# Patient Record
Sex: MALE | Race: WHITE | NOT HISPANIC OR LATINO | URBAN - METROPOLITAN AREA
[De-identification: names, ages, dates, MRNs, and addresses within clinical notes are randomized per-mention and may not be internally consistent; named-entity substitution may affect disease eponyms.]

---

## 2018-07-10 ENCOUNTER — OUTPATIENT (OUTPATIENT)
Dept: OUTPATIENT SERVICES | Facility: HOSPITAL | Age: 58
LOS: 1 days | End: 2018-07-10
Payer: COMMERCIAL

## 2018-07-10 ENCOUNTER — APPOINTMENT (OUTPATIENT)
Dept: UROLOGY | Facility: CLINIC | Age: 58
End: 2018-07-10
Payer: COMMERCIAL

## 2018-07-10 DIAGNOSIS — E34.9 ENDOCRINE DISORDER, UNSPECIFIED: ICD-10-CM

## 2018-07-10 LAB
25(OH)D3 SERPL-MCNC: 27.1 NG/ML
BASOPHILS # BLD AUTO: 0.05 K/UL
BASOPHILS NFR BLD AUTO: 0.8 %
EOSINOPHIL # BLD AUTO: 0.22 K/UL
EOSINOPHIL NFR BLD AUTO: 3.4 %
HBA1C MFR BLD HPLC: 5.3 %
HCT VFR BLD CALC: 51.3 %
HGB BLD-MCNC: 17.7 G/DL
IMM GRANULOCYTES NFR BLD AUTO: 0 %
LYMPHOCYTES # BLD AUTO: 2.3 K/UL
LYMPHOCYTES NFR BLD AUTO: 35.1 %
MAN DIFF?: NORMAL
MCHC RBC-ENTMCNC: 31.2 PG
MCHC RBC-ENTMCNC: 34.5 GM/DL
MCV RBC AUTO: 90.3 FL
MONOCYTES # BLD AUTO: 0.54 K/UL
MONOCYTES NFR BLD AUTO: 8.2 %
NEUTROPHILS # BLD AUTO: 3.44 K/UL
NEUTROPHILS NFR BLD AUTO: 52.5 %
PLATELET # BLD AUTO: 156 K/UL
RBC # BLD: 5.68 M/UL
RBC # FLD: 15.6 %
WBC # FLD AUTO: 6.55 K/UL

## 2018-07-10 PROCEDURE — 99215 OFFICE O/P EST HI 40 MIN: CPT | Mod: 25

## 2018-07-10 PROCEDURE — 93980 PENILE VASCULAR STUDY: CPT | Mod: 26

## 2018-07-10 PROCEDURE — 93980 PENILE VASCULAR STUDY: CPT

## 2018-07-10 RX ORDER — TADALAFIL 20 MG/1
20 TABLET, FILM COATED ORAL
Qty: 10 | Refills: 3 | Status: DISCONTINUED | COMMUNITY
Start: 2018-07-10 | End: 2018-07-10

## 2018-07-11 LAB
ALBUMIN SERPL ELPH-MCNC: 4.3 G/DL
ALP BLD-CCNC: 67 U/L
ALT SERPL-CCNC: 15 U/L
ANION GAP SERPL CALC-SCNC: 15 MMOL/L
AST SERPL-CCNC: 15 U/L
BILIRUB SERPL-MCNC: 0.3 MG/DL
BUN SERPL-MCNC: 22 MG/DL
CALCIUM SERPL-MCNC: 9.3 MG/DL
CHLORIDE SERPL-SCNC: 99 MMOL/L
CHOLEST SERPL-MCNC: 160 MG/DL
CHOLEST/HDLC SERPL: 7 RATIO
CO2 SERPL-SCNC: 25 MMOL/L
CREAT SERPL-MCNC: 1.21 MG/DL
ESTRADIOL SERPL-MCNC: 54 PG/ML
GLUCOSE SERPL-MCNC: 91 MG/DL
HDLC SERPL-MCNC: 23 MG/DL
LDLC SERPL CALC-MCNC: 98 MG/DL
LH SERPL-ACNC: <0.1 IU/L
POTASSIUM SERPL-SCNC: 4.3 MMOL/L
PROLACTIN SERPL-MCNC: 7.8 NG/ML
PROT SERPL-MCNC: 7 G/DL
PSA SERPL-MCNC: 1.42 NG/ML
SODIUM SERPL-SCNC: 139 MMOL/L
TRIGL SERPL-MCNC: 194 MG/DL
TSH SERPL-ACNC: 3.01 UIU/ML

## 2018-07-13 DIAGNOSIS — N52.9 MALE ERECTILE DYSFUNCTION, UNSPECIFIED: ICD-10-CM

## 2018-07-13 DIAGNOSIS — E34.9 ENDOCRINE DISORDER, UNSPECIFIED: ICD-10-CM

## 2018-07-15 LAB
TESTOST BND SERPL-MCNC: 14.4 PG/ML
TESTOST SERPL-MCNC: 592.4 NG/DL

## 2018-07-24 ENCOUNTER — APPOINTMENT (OUTPATIENT)
Dept: UROLOGY | Facility: CLINIC | Age: 58
End: 2018-07-24
Payer: COMMERCIAL

## 2018-07-24 DIAGNOSIS — N52.9 MALE ERECTILE DYSFUNCTION, UNSPECIFIED: ICD-10-CM

## 2018-07-24 PROCEDURE — 99214 OFFICE O/P EST MOD 30 MIN: CPT

## 2019-03-05 ENCOUNTER — APPOINTMENT (OUTPATIENT)
Dept: CARDIOLOGY | Facility: CLINIC | Age: 59
End: 2019-03-05
Payer: COMMERCIAL

## 2019-03-05 ENCOUNTER — NON-APPOINTMENT (OUTPATIENT)
Age: 59
End: 2019-03-05

## 2019-03-05 VITALS
SYSTOLIC BLOOD PRESSURE: 132 MMHG | TEMPERATURE: 98.3 F | WEIGHT: 251 LBS | BODY MASS INDEX: 35.93 KG/M2 | HEART RATE: 70 BPM | HEIGHT: 70 IN | OXYGEN SATURATION: 97 % | DIASTOLIC BLOOD PRESSURE: 80 MMHG

## 2019-03-05 DIAGNOSIS — R07.9 CHEST PAIN, UNSPECIFIED: ICD-10-CM

## 2019-03-05 DIAGNOSIS — I24.9 ACUTE ISCHEMIC HEART DISEASE, UNSPECIFIED: ICD-10-CM

## 2019-03-05 DIAGNOSIS — R73.09 OTHER ABNORMAL GLUCOSE: ICD-10-CM

## 2019-03-05 DIAGNOSIS — Z78.9 OTHER SPECIFIED HEALTH STATUS: ICD-10-CM

## 2019-03-05 DIAGNOSIS — Z82.49 FAMILY HISTORY OF ISCHEMIC HEART DISEASE AND OTHER DISEASES OF THE CIRCULATORY SYSTEM: ICD-10-CM

## 2019-03-05 PROCEDURE — 36415 COLL VENOUS BLD VENIPUNCTURE: CPT

## 2019-03-05 PROCEDURE — 99245 OFF/OP CONSLTJ NEW/EST HI 55: CPT

## 2019-03-05 PROCEDURE — 93000 ELECTROCARDIOGRAM COMPLETE: CPT

## 2019-03-06 ENCOUNTER — MEDICATION RENEWAL (OUTPATIENT)
Age: 59
End: 2019-03-06

## 2019-03-06 LAB
ALBUMIN SERPL ELPH-MCNC: 4.4 G/DL
ALP BLD-CCNC: 67 U/L
ALT SERPL-CCNC: 24 U/L
ANION GAP SERPL CALC-SCNC: 17 MMOL/L
APTT BLD: 33 SEC
AST SERPL-CCNC: 16 U/L
BASOPHILS # BLD AUTO: 0.05 K/UL
BASOPHILS NFR BLD AUTO: 0.7 %
BILIRUB SERPL-MCNC: 0.4 MG/DL
BUN SERPL-MCNC: 21 MG/DL
CALCIUM SERPL-MCNC: 9.3 MG/DL
CHLORIDE SERPL-SCNC: 98 MMOL/L
CHOLEST SERPL-MCNC: 161 MG/DL
CHOLEST/HDLC SERPL: 8.1 RATIO
CO2 SERPL-SCNC: 24 MMOL/L
CREAT SERPL-MCNC: 1.12 MG/DL
EOSINOPHIL # BLD AUTO: 0.29 K/UL
EOSINOPHIL NFR BLD AUTO: 3.8 %
GLUCOSE SERPL-MCNC: 81 MG/DL
HBA1C MFR BLD HPLC: 5.8 %
HCT VFR BLD CALC: 58.8 %
HDLC SERPL-MCNC: 20 MG/DL
HGB BLD-MCNC: 18.7 G/DL
IMM GRANULOCYTES NFR BLD AUTO: 0.5 %
INR PPP: 0.97 RATIO
LDLC SERPL CALC-MCNC: 85 MG/DL
LYMPHOCYTES # BLD AUTO: 2.46 K/UL
LYMPHOCYTES NFR BLD AUTO: 32 %
MAN DIFF?: NORMAL
MCHC RBC-ENTMCNC: 29.3 PG
MCHC RBC-ENTMCNC: 31.8 GM/DL
MCV RBC AUTO: 92.2 FL
MONOCYTES # BLD AUTO: 0.88 K/UL
MONOCYTES NFR BLD AUTO: 11.4 %
NEUTROPHILS # BLD AUTO: 3.97 K/UL
NEUTROPHILS NFR BLD AUTO: 51.6 %
NT-PROBNP SERPL-MCNC: 6 PG/ML
PLATELET # BLD AUTO: 183 K/UL
POTASSIUM SERPL-SCNC: 4.1 MMOL/L
PROT SERPL-MCNC: 7.2 G/DL
PT BLD: 11 SEC
RBC # BLD: 6.38 M/UL
RBC # FLD: 15.1 %
SODIUM SERPL-SCNC: 139 MMOL/L
TRIGL SERPL-MCNC: 281 MG/DL
TROPONIN I SERPL-MCNC: <0.01 NG/ML
TSH SERPL-ACNC: 4 UIU/ML
WBC # FLD AUTO: 7.69 K/UL

## 2019-03-06 RX ORDER — GABAPENTIN 300 MG/1
300 CAPSULE ORAL 3 TIMES DAILY
Qty: 90 | Refills: 3 | Status: ACTIVE | COMMUNITY
Start: 2019-03-05 | End: 1900-01-01

## 2019-03-08 ENCOUNTER — OUTPATIENT (OUTPATIENT)
Dept: OUTPATIENT SERVICES | Facility: HOSPITAL | Age: 59
LOS: 1 days | Discharge: HOME | End: 2019-03-08
Payer: COMMERCIAL

## 2019-03-08 ENCOUNTER — RECORD ABSTRACTING (OUTPATIENT)
Age: 59
End: 2019-03-08

## 2019-03-08 VITALS
WEIGHT: 250 LBS | OXYGEN SATURATION: 96 % | HEART RATE: 89 BPM | SYSTOLIC BLOOD PRESSURE: 130 MMHG | HEIGHT: 70 IN | RESPIRATION RATE: 16 BRPM | DIASTOLIC BLOOD PRESSURE: 97 MMHG | TEMPERATURE: 98 F

## 2019-03-08 DIAGNOSIS — I25.10 ATHEROSCLEROTIC HEART DISEASE OF NATIVE CORONARY ARTERY WITHOUT ANGINA PECTORIS: ICD-10-CM

## 2019-03-08 DIAGNOSIS — Z90.49 ACQUIRED ABSENCE OF OTHER SPECIFIED PARTS OF DIGESTIVE TRACT: Chronic | ICD-10-CM

## 2019-03-08 DIAGNOSIS — Z98.890 OTHER SPECIFIED POSTPROCEDURAL STATES: Chronic | ICD-10-CM

## 2019-03-08 LAB
ALBUMIN SERPL ELPH-MCNC: 4.3 G/DL — SIGNIFICANT CHANGE UP (ref 3.3–5)
ALP SERPL-CCNC: 63 U/L — SIGNIFICANT CHANGE UP (ref 40–120)
ALT FLD-CCNC: 23 U/L — SIGNIFICANT CHANGE UP (ref 10–45)
ANION GAP SERPL CALC-SCNC: 15 MMOL/L — SIGNIFICANT CHANGE UP (ref 5–17)
AST SERPL-CCNC: 19 U/L — SIGNIFICANT CHANGE UP (ref 10–40)
BILIRUB SERPL-MCNC: 0.6 MG/DL — SIGNIFICANT CHANGE UP (ref 0.2–1.2)
BUN SERPL-MCNC: 30 MG/DL — HIGH (ref 7–23)
CALCIUM SERPL-MCNC: 9.4 MG/DL — SIGNIFICANT CHANGE UP (ref 8.4–10.5)
CHLORIDE SERPL-SCNC: 98 MMOL/L — SIGNIFICANT CHANGE UP (ref 96–108)
CO2 SERPL-SCNC: 26 MMOL/L — SIGNIFICANT CHANGE UP (ref 22–31)
CREAT SERPL-MCNC: 1.14 MG/DL — SIGNIFICANT CHANGE UP (ref 0.5–1.3)
GLUCOSE SERPL-MCNC: 102 MG/DL — HIGH (ref 70–99)
HCT VFR BLD CALC: 54.7 % — HIGH (ref 39–50)
HGB BLD-MCNC: 18.9 G/DL — HIGH (ref 13–17)
MCHC RBC-ENTMCNC: 30.3 PG — SIGNIFICANT CHANGE UP (ref 27–34)
MCHC RBC-ENTMCNC: 34.6 GM/DL — SIGNIFICANT CHANGE UP (ref 32–36)
MCV RBC AUTO: 87.5 FL — SIGNIFICANT CHANGE UP (ref 80–100)
PLATELET # BLD AUTO: 175 K/UL — SIGNIFICANT CHANGE UP (ref 150–400)
POTASSIUM SERPL-MCNC: 3.7 MMOL/L — SIGNIFICANT CHANGE UP (ref 3.5–5.3)
POTASSIUM SERPL-SCNC: 3.7 MMOL/L — SIGNIFICANT CHANGE UP (ref 3.5–5.3)
PROT SERPL-MCNC: 7 G/DL — SIGNIFICANT CHANGE UP (ref 6–8.3)
RBC # BLD: 6.25 M/UL — HIGH (ref 4.2–5.8)
RBC # FLD: 13.5 % — SIGNIFICANT CHANGE UP (ref 10.3–14.5)
SODIUM SERPL-SCNC: 139 MMOL/L — SIGNIFICANT CHANGE UP (ref 135–145)
WBC # BLD: 9.7 K/UL — SIGNIFICANT CHANGE UP (ref 3.8–10.5)
WBC # FLD AUTO: 9.7 K/UL — SIGNIFICANT CHANGE UP (ref 3.8–10.5)

## 2019-03-08 PROCEDURE — 93010 ELECTROCARDIOGRAM REPORT: CPT

## 2019-03-08 PROCEDURE — 99203 OFFICE O/P NEW LOW 30 MIN: CPT

## 2019-03-08 PROCEDURE — C1769: CPT

## 2019-03-08 PROCEDURE — C1887: CPT

## 2019-03-08 PROCEDURE — 80053 COMPREHEN METABOLIC PANEL: CPT

## 2019-03-08 PROCEDURE — C1894: CPT

## 2019-03-08 PROCEDURE — 93454 CORONARY ARTERY ANGIO S&I: CPT

## 2019-03-08 PROCEDURE — 85027 COMPLETE CBC AUTOMATED: CPT

## 2019-03-08 PROCEDURE — 93005 ELECTROCARDIOGRAM TRACING: CPT

## 2019-03-08 PROCEDURE — 93454 CORONARY ARTERY ANGIO S&I: CPT | Mod: 26,GC

## 2019-03-08 RX ORDER — GABAPENTIN 400 MG/1
1 CAPSULE ORAL
Qty: 0 | Refills: 0 | COMMUNITY

## 2019-03-08 RX ORDER — DILTIAZEM HCL 120 MG
1 CAPSULE, EXT RELEASE 24 HR ORAL
Qty: 0 | Refills: 0 | COMMUNITY

## 2019-03-08 RX ORDER — LOSARTAN/HYDROCHLOROTHIAZIDE 100MG-25MG
1 TABLET ORAL
Qty: 0 | Refills: 0 | COMMUNITY

## 2019-03-08 NOTE — H&P CARDIOLOGY - HISTORY OF PRESENT ILLNESS
59 y/o  male PMH HTN, chronic lower back and ankle pain following a fall years ago, suspected ANN-MARIE (pending pulmonary eval), obesity, with c/o progressive exertional left sided chest pressure radiating to the left arm with associated dyspnea over the past few months. He states symptom onset in now with any activity. Seen and evaluated by cardiologist, Dr. Chandler, and referred for cardiac catheterization today. 57 y/o  male PMH HTN, chronic lower back and ankle pain following a fall years ago, suspected ANN-MARIE (pending pulmonary eval), obesity (BMI 35), with c/o progressive exertional left sided chest pressure radiating to the left arm with associated dyspnea over the past few months. He states symptom onset in now with any activity. Seen and evaluated by cardiologist, Dr. Chandler, and referred for cardiac catheterization today.

## 2019-03-08 NOTE — H&P CARDIOLOGY - PMH
Chronic low back pain, unspecified back pain laterality, with sciatica presence unspecified    Essential hypertension    Obesity, unspecified classification, unspecified obesity type, unspecified whether serious comorbidity present    ANN-MARIE (obstructive sleep apnea)  suspected

## 2019-03-11 ENCOUNTER — CLINICAL ADVICE (OUTPATIENT)
Age: 59
End: 2019-03-11

## 2019-03-11 PROBLEM — G47.33 OBSTRUCTIVE SLEEP APNEA (ADULT) (PEDIATRIC): Chronic | Status: ACTIVE | Noted: 2019-03-08

## 2019-03-11 PROBLEM — I10 ESSENTIAL (PRIMARY) HYPERTENSION: Chronic | Status: ACTIVE | Noted: 2019-03-08

## 2019-03-11 PROBLEM — M54.5 LOW BACK PAIN: Chronic | Status: ACTIVE | Noted: 2019-03-08

## 2019-03-11 PROBLEM — E66.9 OBESITY, UNSPECIFIED: Chronic | Status: ACTIVE | Noted: 2019-03-08

## 2019-03-11 NOTE — REASON FOR VISIT
[FreeTextEntry1] : 58-year-old man with strong family history of coronary artery disease with exertional chest pressure and shortness of breath for 6 months increased in the last week.

## 2019-03-11 NOTE — REVIEW OF SYSTEMS
[Dyspnea on exertion] : dyspnea during exertion [Chest  Pressure] : chest pressure [see HPI] : see HPI [Numbness (Hypesthesia)] : numbness [Tingling (Paresthesia)] : tingling [Negative] : Heme/Lymph [Recent Weight Gain (___ Lbs)] : no recent weight gain [Recent Weight Loss (___ Lbs)] : no recent weight loss [Lower Ext Edema] : no extremity edema [Leg Claudication] : no intermittent leg claudication [Cough] : no cough [Wheezing] : no wheezing [Coughing Up Blood] : no hemoptysis [Abdominal Pain] : no abdominal pain [Heartburn] : no heartburn [Change in Appetite] : no change in appetite [Dizziness] : no dizziness [Memory Lapses Or Loss] : no memory lapses or loss [Depression] : no depression [Anxiety] : no anxiety [Suicidal] : not suicidal [Easy Bleeding] : no tendency for easy bleeding [Easy Bruising] : no tendency for easy bruising

## 2019-03-11 NOTE — HISTORY OF PRESENT ILLNESS
[FreeTextEntry1] : March 5, 2019. The patient is a 58-year-old, overweight man in both his father and mother have severe coronary artery disease. The patient himself has a low HDL between 16 and 23 for some time. He also has hypertension for the last few years. That has been treated with diltiazem 300 and losartan//25. No diabetes. No cigarette smoking. He tends to delay medical care. He thinks the last few months and maybe as far back as 6. He gets pressure in his chest, which radiates to his left thumb when he is exerting himself alone. Some shortness of breath. This has progressed to where her symptoms are coming with virtually any exertion. The last week or so. No rest symptoms. He recently saw pulmonary for one visit and was told that he probably has severe sleep apnea, but no other diagnosis.\par March 8, 2019. Cardiac catheterization revealed totally normal coronary arteries.\par March 11, 2019. Reviewed results with patient. Stop metoprolol and stop aspirin. Continue atorvastatin because of low HDL. Schedule echocardiogram because of shortness of breath with abnormal ECG. Discussed elevated hemoglobin and hematocrit. Will draw erythropoietin level when he returns for his echo and then consider a hematology referral

## 2019-03-11 NOTE — PHYSICAL EXAM
[General Appearance - Well Developed] : well developed [Normal Appearance] : normal appearance [Well Groomed] : well groomed [General Appearance - Well Nourished] : well nourished [No Deformities] : no deformities [General Appearance - In No Acute Distress] : no acute distress [Normal Conjunctiva] : the conjunctiva exhibited no abnormalities [Eyelids - No Xanthelasma] : the eyelids demonstrated no xanthelasmas [Normal Oral Mucosa] : normal oral mucosa [No Oral Pallor] : no oral pallor [No Oral Cyanosis] : no oral cyanosis [Normal Jugular Venous A Waves Present] : normal jugular venous A waves present [Normal Jugular Venous V Waves Present] : normal jugular venous V waves present [No Jugular Venous Coleman A Waves] : no jugular venous coleman A waves [Heart Rate And Rhythm] : heart rate and rhythm were normal [Heart Sounds] : normal S1 and S2 [Murmurs] : no murmurs present [Respiration, Rhythm And Depth] : normal respiratory rhythm and effort [Exaggerated Use Of Accessory Muscles For Inspiration] : no accessory muscle use [Auscultation Breath Sounds / Voice Sounds] : lungs were clear to auscultation bilaterally [Abdomen Soft] : soft [Abdomen Tenderness] : non-tender [Abdomen Mass (___ Cm)] : no abdominal mass palpated [Abnormal Walk] : normal gait [Gait - Sufficient For Exercise Testing] : the gait was sufficient for exercise testing [Nail Clubbing] : no clubbing of the fingernails [Cyanosis, Localized] : no localized cyanosis [Petechial Hemorrhages (___cm)] : no petechial hemorrhages [Skin Color & Pigmentation] : normal skin color and pigmentation [] : no rash [No Venous Stasis] : no venous stasis [Skin Lesions] : no skin lesions [No Skin Ulcers] : no skin ulcer [No Xanthoma] : no  xanthoma was observed [Oriented To Time, Place, And Person] : oriented to person, place, and time [Affect] : the affect was normal [Mood] : the mood was normal [No Anxiety] : not feeling anxious [FreeTextEntry1] : No edema. Pulses 2+ bilaterally, symmetric, including pedal

## 2019-03-27 ENCOUNTER — NON-APPOINTMENT (OUTPATIENT)
Age: 59
End: 2019-03-27

## 2019-03-27 ENCOUNTER — APPOINTMENT (OUTPATIENT)
Dept: CARDIOLOGY | Facility: CLINIC | Age: 59
End: 2019-03-27
Payer: COMMERCIAL

## 2019-03-27 VITALS
HEIGHT: 70 IN | BODY MASS INDEX: 35.5 KG/M2 | SYSTOLIC BLOOD PRESSURE: 145 MMHG | HEART RATE: 90 BPM | OXYGEN SATURATION: 95 % | TEMPERATURE: 97.3 F | WEIGHT: 248 LBS | DIASTOLIC BLOOD PRESSURE: 90 MMHG

## 2019-03-27 PROCEDURE — 36415 COLL VENOUS BLD VENIPUNCTURE: CPT

## 2019-03-27 PROCEDURE — 93306 TTE W/DOPPLER COMPLETE: CPT

## 2019-03-27 PROCEDURE — 93000 ELECTROCARDIOGRAM COMPLETE: CPT

## 2019-03-27 PROCEDURE — 99214 OFFICE O/P EST MOD 30 MIN: CPT | Mod: 25

## 2019-03-27 RX ORDER — METOPROLOL SUCCINATE 25 MG/1
25 TABLET, EXTENDED RELEASE ORAL
Qty: 1 | Refills: 3 | Status: DISCONTINUED | COMMUNITY
Start: 2019-03-05 | End: 2019-03-27

## 2019-03-27 NOTE — REASON FOR VISIT
[FreeTextEntry1] : 58-year-old man with strong family history of coronary artery disease with exertional chest pressure and shortness of breath for 6 months increased in the last week, normal coronaries on cath..

## 2019-03-27 NOTE — HISTORY OF PRESENT ILLNESS
[FreeTextEntry1] : March 5, 2019. The patient is a 58-year-old, overweight man in both his father and mother have severe coronary artery disease. The patient himself has a low HDL between 16 and 23 for some time. He also has hypertension for the last few years. That has been treated with diltiazem 300 and losartan//25. No diabetes. No cigarette smoking. He tends to delay medical care. He thinks the last few months and maybe as far back as 6. He gets pressure in his chest, which radiates to his left thumb when he is exerting himself alone. Some shortness of breath. This has progressed to where her symptoms are coming with virtually any exertion. The last week or so. No rest symptoms. He recently saw pulmonary for one visit and was told that he probably has severe sleep apnea, but no other diagnosis.\par March 8, 2019. Cardiac catheterization revealed totally normal coronary arteries.\par March 11, 2019. Reviewed results with patient. Stop metoprolol and stop aspirin. Continue atorvastatin because of low HDL. Schedule echocardiogram because of shortness of breath with abnormal ECG. Discussed elevated hemoglobin and hematocrit. Will draw erythropoietin level when he returns for his echo and then consider a hematology referral. \par March 27, 2019. Patient returns for followup and echocardiogram. The echo is somewhat suboptimal, but unremarkable. Maybe some minor features of hypertrophic cardiomyopathy with concentric remodeling, chordal MATHEW, diastolic function difficult to determine, systolic function seems okay. Repeat EKG still remains with T wave abnormalities V5 and V6, as well as first degree AV block and  borderline right axis. Will remain on current medications, followup about his sleep apnea, send erythropoietin level about his possible polycythemia, and consider hematology workup. If necessary perhaps consider cardiac MRI in the future

## 2019-03-27 NOTE — PHYSICAL EXAM
[General Appearance - Well Developed] : well developed [Normal Appearance] : normal appearance [Well Groomed] : well groomed [General Appearance - Well Nourished] : well nourished [No Deformities] : no deformities [General Appearance - In No Acute Distress] : no acute distress [Normal Conjunctiva] : the conjunctiva exhibited no abnormalities [Eyelids - No Xanthelasma] : the eyelids demonstrated no xanthelasmas [Normal Oral Mucosa] : normal oral mucosa [No Oral Pallor] : no oral pallor [No Oral Cyanosis] : no oral cyanosis [Normal Jugular Venous A Waves Present] : normal jugular venous A waves present [Normal Jugular Venous V Waves Present] : normal jugular venous V waves present [No Jugular Venous Coleman A Waves] : no jugular venous coleman A waves [Respiration, Rhythm And Depth] : normal respiratory rhythm and effort [Exaggerated Use Of Accessory Muscles For Inspiration] : no accessory muscle use [Auscultation Breath Sounds / Voice Sounds] : lungs were clear to auscultation bilaterally [Heart Rate And Rhythm] : heart rate and rhythm were normal [Heart Sounds] : normal S1 and S2 [Murmurs] : no murmurs present [Abdomen Soft] : soft [Abdomen Tenderness] : non-tender [Abdomen Mass (___ Cm)] : no abdominal mass palpated [Abnormal Walk] : normal gait [Gait - Sufficient For Exercise Testing] : the gait was sufficient for exercise testing [Nail Clubbing] : no clubbing of the fingernails [Cyanosis, Localized] : no localized cyanosis [Petechial Hemorrhages (___cm)] : no petechial hemorrhages [Skin Color & Pigmentation] : normal skin color and pigmentation [] : no rash [No Venous Stasis] : no venous stasis [Skin Lesions] : no skin lesions [No Skin Ulcers] : no skin ulcer [No Xanthoma] : no  xanthoma was observed [Oriented To Time, Place, And Person] : oriented to person, place, and time [Affect] : the affect was normal [Mood] : the mood was normal [No Anxiety] : not feeling anxious [FreeTextEntry1] : No edema. Pulses 2+ bilaterally, symmetric, including pedal

## 2019-03-27 NOTE — REVIEW OF SYSTEMS
[Dyspnea on exertion] : dyspnea during exertion [see HPI] : see HPI [Numbness (Hypesthesia)] : numbness [Tingling (Paresthesia)] : tingling [Negative] : Heme/Lymph [Recent Weight Gain (___ Lbs)] : no recent weight gain [Recent Weight Loss (___ Lbs)] : no recent weight loss [Chest  Pressure] : no chest pressure [Lower Ext Edema] : no extremity edema [Leg Claudication] : no intermittent leg claudication [Cough] : no cough [Wheezing] : no wheezing [Coughing Up Blood] : no hemoptysis [Abdominal Pain] : no abdominal pain [Heartburn] : no heartburn [Change in Appetite] : no change in appetite [Dizziness] : no dizziness [Memory Lapses Or Loss] : no memory lapses or loss [Depression] : no depression [Anxiety] : no anxiety [Suicidal] : not suicidal [Easy Bleeding] : no tendency for easy bleeding [Easy Bruising] : no tendency for easy bruising

## 2019-04-01 LAB
ALBUMIN SERPL ELPH-MCNC: 4.4 G/DL
ALP BLD-CCNC: 69 U/L
ALT SERPL-CCNC: 22 U/L
ANION GAP SERPL CALC-SCNC: 14 MMOL/L
AST SERPL-CCNC: 18 U/L
BASOPHILS # BLD AUTO: 0.05 K/UL
BASOPHILS NFR BLD AUTO: 0.6 %
BILIRUB SERPL-MCNC: 0.5 MG/DL
BUN SERPL-MCNC: 28 MG/DL
CALCIUM SERPL-MCNC: 9.1 MG/DL
CHLORIDE SERPL-SCNC: 101 MMOL/L
CO2 SERPL-SCNC: 27 MMOL/L
CREAT SERPL-MCNC: 1.22 MG/DL
EOSINOPHIL # BLD AUTO: 0.28 K/UL
EOSINOPHIL NFR BLD AUTO: 3.3 %
EPO SERPL-MCNC: 13.5 MIU/ML
ERYTHROCYTE [SEDIMENTATION RATE] IN BLOOD BY WESTERGREN METHOD: 10 MM/HR
FERRITIN SERPL-MCNC: 65 NG/ML
GLUCOSE SERPL-MCNC: 94 MG/DL
HCT VFR BLD CALC: 54.1 %
HGB BLD-MCNC: 17.6 G/DL
IMM GRANULOCYTES NFR BLD AUTO: 0.2 %
IRON SATN MFR SERPL: 15 %
IRON SERPL-MCNC: 64 UG/DL
LYMPHOCYTES # BLD AUTO: 2.46 K/UL
LYMPHOCYTES NFR BLD AUTO: 29.3 %
MAN DIFF?: NORMAL
MCHC RBC-ENTMCNC: 29.3 PG
MCHC RBC-ENTMCNC: 32.5 GM/DL
MCV RBC AUTO: 90 FL
MONOCYTES # BLD AUTO: 0.79 K/UL
MONOCYTES NFR BLD AUTO: 9.4 %
NEUTROPHILS # BLD AUTO: 4.79 K/UL
NEUTROPHILS NFR BLD AUTO: 57.2 %
PLATELET # BLD AUTO: 201 K/UL
POTASSIUM SERPL-SCNC: 3.6 MMOL/L
PROT SERPL-MCNC: 7.2 G/DL
RBC # BLD: 6.01 M/UL
RBC # FLD: 14.6 %
SODIUM SERPL-SCNC: 142 MMOL/L
TIBC SERPL-MCNC: 420 UG/DL
UIBC SERPL-MCNC: 356 UG/DL
WBC # FLD AUTO: 8.39 K/UL

## 2019-06-10 ENCOUNTER — APPOINTMENT (OUTPATIENT)
Dept: ORTHOPEDIC SURGERY | Facility: CLINIC | Age: 59
End: 2019-06-10
Payer: COMMERCIAL

## 2019-06-10 VITALS
DIASTOLIC BLOOD PRESSURE: 77 MMHG | HEART RATE: 79 BPM | BODY MASS INDEX: 32.93 KG/M2 | HEIGHT: 70 IN | WEIGHT: 230 LBS | SYSTOLIC BLOOD PRESSURE: 126 MMHG

## 2019-06-10 DIAGNOSIS — Z86.39 PERSONAL HISTORY OF OTHER ENDOCRINE, NUTRITIONAL AND METABOLIC DISEASE: ICD-10-CM

## 2019-06-10 DIAGNOSIS — Z86.79 PERSONAL HISTORY OF OTHER DISEASES OF THE CIRCULATORY SYSTEM: ICD-10-CM

## 2019-06-10 DIAGNOSIS — M43.16 SPONDYLOLISTHESIS, LUMBAR REGION: ICD-10-CM

## 2019-06-10 DIAGNOSIS — Z78.9 OTHER SPECIFIED HEALTH STATUS: ICD-10-CM

## 2019-06-10 PROCEDURE — 99204 OFFICE O/P NEW MOD 45 MIN: CPT

## 2019-08-01 ENCOUNTER — APPOINTMENT (OUTPATIENT)
Dept: CARDIOLOGY | Facility: CLINIC | Age: 59
End: 2019-08-01

## 2019-09-06 ENCOUNTER — RX RENEWAL (OUTPATIENT)
Age: 59
End: 2019-09-06

## 2019-09-09 ENCOUNTER — RX RENEWAL (OUTPATIENT)
Age: 59
End: 2019-09-09

## 2019-09-23 ENCOUNTER — NON-APPOINTMENT (OUTPATIENT)
Age: 59
End: 2019-09-23

## 2019-09-23 ENCOUNTER — APPOINTMENT (OUTPATIENT)
Dept: CARDIOLOGY | Facility: CLINIC | Age: 59
End: 2019-09-23
Payer: COMMERCIAL

## 2019-09-23 ENCOUNTER — MEDICATION RENEWAL (OUTPATIENT)
Age: 59
End: 2019-09-23

## 2019-09-23 VITALS
HEIGHT: 70 IN | HEART RATE: 98 BPM | WEIGHT: 241 LBS | OXYGEN SATURATION: 97 % | DIASTOLIC BLOOD PRESSURE: 67 MMHG | SYSTOLIC BLOOD PRESSURE: 112 MMHG | BODY MASS INDEX: 34.5 KG/M2

## 2019-09-23 DIAGNOSIS — D75.1 SECONDARY POLYCYTHEMIA: ICD-10-CM

## 2019-09-23 PROCEDURE — 99214 OFFICE O/P EST MOD 30 MIN: CPT

## 2019-09-23 PROCEDURE — 36415 COLL VENOUS BLD VENIPUNCTURE: CPT

## 2019-09-23 PROCEDURE — 93000 ELECTROCARDIOGRAM COMPLETE: CPT

## 2019-09-23 NOTE — HISTORY OF PRESENT ILLNESS
[FreeTextEntry1] : March 5, 2019. The patient is a 58-year-old, overweight man in both his father and mother have severe coronary artery disease. The patient himself has a low HDL between 16 and 23 for some time. He also has hypertension for the last few years. That has been treated with diltiazem 300 and losartan//25. No diabetes. No cigarette smoking. He tends to delay medical care. He thinks the last few months and maybe as far back as 6. He gets pressure in his chest, which radiates to his left thumb when he is exerting himself alone. Some shortness of breath. This has progressed to where her symptoms are coming with virtually any exertion. The last week or so. No rest symptoms. He recently saw pulmonary for one visit and was told that he probably has severe sleep apnea, but no other diagnosis.\par March 8, 2019. Cardiac catheterization revealed totally normal coronary arteries.\par March 11, 2019. Reviewed results with patient. Stop metoprolol and stop aspirin. Continue atorvastatin because of low HDL. Schedule echocardiogram because of shortness of breath with abnormal ECG. Discussed elevated hemoglobin and hematocrit. Will draw erythropoietin level when he returns for his echo and then consider a hematology referral. \par March 27, 2019. Patient returns for followup and echocardiogram. The echo is somewhat suboptimal, but unremarkable. Maybe some minor features of hypertrophic cardiomyopathy with concentric remodeling, chordal MATHEW, diastolic function difficult to determine, systolic function seems okay. Repeat EKG still remains with T wave abnormalities V5 and V6, as well as first degree AV block and  borderline right axis. Will remain on current medications, followup about his sleep apnea, send erythropoietin level about his possible polycythemia, and consider hematology workup. If necessary perhaps consider cardiac MRI in the future.\par September 23, 2019.  Returns for visit as could not get his prescriptions renewed.  Denies any new symptoms or change.  Seems to be asymptomatic and doing well.  Still overweight but weight coming down slowly.

## 2019-09-23 NOTE — REVIEW OF SYSTEMS
[Dyspnea on exertion] : dyspnea during exertion [see HPI] : see HPI [Numbness (Hypesthesia)] : numbness [Tingling (Paresthesia)] : tingling [Negative] : Heme/Lymph [Recent Weight Gain (___ Lbs)] : no recent weight gain [Feeling Fatigued] : feeling fatigued [Chest  Pressure] : no chest pressure [Recent Weight Loss (___ Lbs)] : recent [unfilled] ~Ulb weight loss [Leg Claudication] : no intermittent leg claudication [Lower Ext Edema] : no extremity edema [Cough] : no cough [Coughing Up Blood] : no hemoptysis [Wheezing] : no wheezing [Abdominal Pain] : no abdominal pain [Change in Appetite] : no change in appetite [Heartburn] : no heartburn [Dizziness] : no dizziness [Memory Lapses Or Loss] : no memory lapses or loss [Anxiety] : no anxiety [Depression] : no depression [Suicidal] : not suicidal [Easy Bleeding] : no tendency for easy bleeding [Easy Bruising] : no tendency for easy bruising

## 2019-09-24 LAB
ALBUMIN SERPL ELPH-MCNC: 4.2 G/DL
ALP BLD-CCNC: 65 U/L
ALT SERPL-CCNC: 23 U/L
ANION GAP SERPL CALC-SCNC: 13 MMOL/L
AST SERPL-CCNC: 15 U/L
BASOPHILS # BLD AUTO: 0.05 K/UL
BASOPHILS NFR BLD AUTO: 0.6 %
BILIRUB SERPL-MCNC: 0.6 MG/DL
BUN SERPL-MCNC: 20 MG/DL
CALCIUM SERPL-MCNC: 9.4 MG/DL
CHLORIDE SERPL-SCNC: 101 MMOL/L
CHOLEST SERPL-MCNC: 143 MG/DL
CHOLEST/HDLC SERPL: 6.5 RATIO
CO2 SERPL-SCNC: 26 MMOL/L
CREAT SERPL-MCNC: 1.17 MG/DL
EOSINOPHIL # BLD AUTO: 0.24 K/UL
EOSINOPHIL NFR BLD AUTO: 3 %
ESTIMATED AVERAGE GLUCOSE: 120 MG/DL
GLUCOSE SERPL-MCNC: 98 MG/DL
HBA1C MFR BLD HPLC: 5.8 %
HCT VFR BLD CALC: 50.5 %
HDLC SERPL-MCNC: 22 MG/DL
HGB BLD-MCNC: 16.7 G/DL
IMM GRANULOCYTES NFR BLD AUTO: 0.4 %
LDLC SERPL CALC-MCNC: 61 MG/DL
LYMPHOCYTES # BLD AUTO: 2.24 K/UL
LYMPHOCYTES NFR BLD AUTO: 28.2 %
MAN DIFF?: NORMAL
MCHC RBC-ENTMCNC: 30 PG
MCHC RBC-ENTMCNC: 33.1 GM/DL
MCV RBC AUTO: 90.7 FL
MONOCYTES # BLD AUTO: 0.87 K/UL
MONOCYTES NFR BLD AUTO: 11 %
NEUTROPHILS # BLD AUTO: 4.51 K/UL
NEUTROPHILS NFR BLD AUTO: 56.8 %
NT-PROBNP SERPL-MCNC: 11 PG/ML
PLATELET # BLD AUTO: 177 K/UL
POTASSIUM SERPL-SCNC: 3.6 MMOL/L
PROT SERPL-MCNC: 6.6 G/DL
RBC # BLD: 5.57 M/UL
RBC # FLD: 14.6 %
SODIUM SERPL-SCNC: 140 MMOL/L
TRIGL SERPL-MCNC: 300 MG/DL
TSH SERPL-ACNC: 2.67 UIU/ML
WBC # FLD AUTO: 7.94 K/UL

## 2019-09-27 ENCOUNTER — RX RENEWAL (OUTPATIENT)
Age: 59
End: 2019-09-27

## 2019-10-03 ENCOUNTER — RECORD ABSTRACTING (OUTPATIENT)
Age: 59
End: 2019-10-03

## 2019-10-03 RX ORDER — LOSARTAN POTASSIUM AND HYDROCHLOROTHIAZIDE 25; 100 MG/1; MG/1
100-25 TABLET ORAL
Qty: 90 | Refills: 1 | Status: DISCONTINUED | COMMUNITY
Start: 2019-03-05 | End: 2019-10-03

## 2019-10-28 ENCOUNTER — INBOUND DOCUMENT (OUTPATIENT)
Age: 59
End: 2019-10-28

## 2019-11-13 ENCOUNTER — MEDICATION RENEWAL (OUTPATIENT)
Age: 59
End: 2019-11-13

## 2020-02-18 ENCOUNTER — RX RENEWAL (OUTPATIENT)
Age: 60
End: 2020-02-18

## 2020-03-23 ENCOUNTER — NON-APPOINTMENT (OUTPATIENT)
Age: 60
End: 2020-03-23

## 2020-03-23 ENCOUNTER — APPOINTMENT (OUTPATIENT)
Dept: CARDIOLOGY | Facility: CLINIC | Age: 60
End: 2020-03-23
Payer: COMMERCIAL

## 2020-03-23 VITALS
HEIGHT: 70 IN | DIASTOLIC BLOOD PRESSURE: 88 MMHG | BODY MASS INDEX: 36.36 KG/M2 | OXYGEN SATURATION: 98 % | HEART RATE: 74 BPM | SYSTOLIC BLOOD PRESSURE: 155 MMHG | WEIGHT: 254 LBS | TEMPERATURE: 98.4 F

## 2020-03-23 VITALS — DIASTOLIC BLOOD PRESSURE: 72 MMHG | HEART RATE: 68 BPM | SYSTOLIC BLOOD PRESSURE: 135 MMHG

## 2020-03-23 PROCEDURE — 93000 ELECTROCARDIOGRAM COMPLETE: CPT

## 2020-03-23 PROCEDURE — 36415 COLL VENOUS BLD VENIPUNCTURE: CPT

## 2020-03-23 PROCEDURE — 99214 OFFICE O/P EST MOD 30 MIN: CPT

## 2020-03-23 NOTE — PHYSICAL EXAM
[General Appearance - Well Developed] : well developed [Normal Appearance] : normal appearance [Well Groomed] : well groomed [General Appearance - Well Nourished] : well nourished [No Deformities] : no deformities [General Appearance - In No Acute Distress] : no acute distress [Normal Conjunctiva] : the conjunctiva exhibited no abnormalities [Eyelids - No Xanthelasma] : the eyelids demonstrated no xanthelasmas [Normal Oral Mucosa] : normal oral mucosa [No Oral Pallor] : no oral pallor [No Oral Cyanosis] : no oral cyanosis [Normal Jugular Venous A Waves Present] : normal jugular venous A waves present [Normal Jugular Venous V Waves Present] : normal jugular venous V waves present [No Jugular Venous Coleman A Waves] : no jugular venous coleman A waves [Respiration, Rhythm And Depth] : normal respiratory rhythm and effort [Exaggerated Use Of Accessory Muscles For Inspiration] : no accessory muscle use [Auscultation Breath Sounds / Voice Sounds] : lungs were clear to auscultation bilaterally [Heart Rate And Rhythm] : heart rate and rhythm were normal [Heart Sounds] : normal S1 and S2 [Murmurs] : no murmurs present [Abdomen Soft] : soft [Abdomen Tenderness] : non-tender [Abdomen Mass (___ Cm)] : no abdominal mass palpated [Abnormal Walk] : normal gait [Gait - Sufficient For Exercise Testing] : the gait was sufficient for exercise testing [Nail Clubbing] : no clubbing of the fingernails [Cyanosis, Localized] : no localized cyanosis [Petechial Hemorrhages (___cm)] : no petechial hemorrhages [FreeTextEntry1] : No edema. Pulses 2+ bilaterally, symmetric, including pedal [Skin Color & Pigmentation] : normal skin color and pigmentation [] : no rash [No Venous Stasis] : no venous stasis [Skin Lesions] : no skin lesions [No Skin Ulcers] : no skin ulcer [No Xanthoma] : no  xanthoma was observed [Oriented To Time, Place, And Person] : oriented to person, place, and time [Affect] : the affect was normal [Mood] : the mood was normal [No Anxiety] : not feeling anxious

## 2020-03-23 NOTE — REASON FOR VISIT
[FreeTextEntry1] : 59-year-old man with strong family history of coronary artery disease with exertional chest pressure and shortness of breath for 6 months, normal coronaries on cath..

## 2020-03-23 NOTE — HISTORY OF PRESENT ILLNESS
[FreeTextEntry1] : March 5, 2019. The patient is a 58-year-old, overweight man in both his father and mother have severe coronary artery disease. The patient himself has a low HDL between 16 and 23 for some time. He also has hypertension for the last few years. That has been treated with diltiazem 300 and losartan//25. No diabetes. No cigarette smoking. He tends to delay medical care. He thinks the last few months and maybe as far back as 6. He gets pressure in his chest, which radiates to his left thumb when he is exerting himself alone. Some shortness of breath. This has progressed to where her symptoms are coming with virtually any exertion. The last week or so. No rest symptoms. He recently saw pulmonary for one visit and was told that he probably has severe sleep apnea, but no other diagnosis.\par March 8, 2019. Cardiac catheterization revealed totally normal coronary arteries.\par March 11, 2019. Reviewed results with patient. Stop metoprolol and stop aspirin. Continue atorvastatin because of low HDL. Schedule echocardiogram because of shortness of breath with abnormal ECG. Discussed elevated hemoglobin and hematocrit. Will draw erythropoietin level when he returns for his echo and then consider a hematology referral. \par March 27, 2019. Patient returns for followup and echocardiogram. The echo is somewhat suboptimal, but unremarkable. Maybe some minor features of hypertrophic cardiomyopathy with concentric remodeling, chordal MATHEW, diastolic function difficult to determine, systolic function seems okay. Repeat EKG still remains with T wave abnormalities V5 and V6, as well as first degree AV block and  borderline right axis. Will remain on current medications, followup about his sleep apnea, send erythropoietin level about his possible polycythemia, and consider hematology workup. If necessary perhaps consider cardiac MRI in the future.\par September 23, 2019.  Returns for visit as could not get his prescriptions renewed.  Denies any new symptoms or change.  Seems to be asymptomatic and doing well.  Still overweight but weight coming down slowly.\par October 3, 2019. Reviewed labs with patient. Patient claims he has been off losartan HCT for some time now. We agreed to continue off losartan HCT as long as he returns in 2 months for follow-up exam and if blood pressure excellent will continue to stay off of that medicine. \par March 23, 2020 Patient doing okay but somewhat stressed as his brother who was not overweight had a good diet worked out etc. had a heart attack, did get a stent but then about 3 days after discharge had sudden cardiac death at home.  Patient himself is doing well except he has put back his weight and his blood pressure has gone up some.  No cardiac symptoms.  EKG has mildly inverted T waves in V4 through 6 which have been there on some of his previous EKGs on and off.  No other medical issues.

## 2020-03-23 NOTE — REVIEW OF SYSTEMS
[Recent Weight Gain (___ Lbs)] : recent [unfilled] ~Ulb weight gain [Feeling Fatigued] : not feeling fatigued [Recent Weight Loss (___ Lbs)] : no recent weight loss [Dyspnea on exertion] : not dyspnea during exertion [Chest  Pressure] : no chest pressure [Lower Ext Edema] : no extremity edema [Leg Claudication] : no intermittent leg claudication [Cough] : no cough [Wheezing] : no wheezing [Coughing Up Blood] : no hemoptysis [Abdominal Pain] : no abdominal pain [Heartburn] : no heartburn [Change in Appetite] : no change in appetite [see HPI] : see HPI [Dizziness] : no dizziness [Numbness (Hypesthesia)] : numbness [Tingling (Paresthesia)] : tingling [Memory Lapses Or Loss] : no memory lapses or loss [Depression] : no depression [Anxiety] : no anxiety [Suicidal] : not suicidal [Easy Bleeding] : no tendency for easy bleeding [Easy Bruising] : no tendency for easy bruising [Negative] : Heme/Lymph

## 2020-03-25 LAB
ALBUMIN SERPL ELPH-MCNC: 4.5 G/DL
ALP BLD-CCNC: 110 U/L
ALT SERPL-CCNC: 32 U/L
ANION GAP SERPL CALC-SCNC: 22 MMOL/L
AST SERPL-CCNC: 16 U/L
BASOPHILS # BLD AUTO: 0.05 K/UL
BASOPHILS NFR BLD AUTO: 0.7 %
BILIRUB SERPL-MCNC: 0.4 MG/DL
BUN SERPL-MCNC: 16 MG/DL
CALCIUM SERPL-MCNC: 8.9 MG/DL
CHLORIDE SERPL-SCNC: 103 MMOL/L
CHOLEST SERPL-MCNC: 144 MG/DL
CHOLEST/HDLC SERPL: 4.7 RATIO
CO2 SERPL-SCNC: 18 MMOL/L
CREAT SERPL-MCNC: 0.9 MG/DL
EOSINOPHIL # BLD AUTO: 0.24 K/UL
EOSINOPHIL NFR BLD AUTO: 3.5 %
ESTIMATED AVERAGE GLUCOSE: 126 MG/DL
GLUCOSE SERPL-MCNC: 96 MG/DL
HBA1C MFR BLD HPLC: 6 %
HCT VFR BLD CALC: 39 %
HDLC SERPL-MCNC: 30 MG/DL
HGB BLD-MCNC: 12.8 G/DL
IMM GRANULOCYTES NFR BLD AUTO: 0.9 %
LDLC SERPL CALC-MCNC: 66 MG/DL
LYMPHOCYTES # BLD AUTO: 2.52 K/UL
LYMPHOCYTES NFR BLD AUTO: 36.7 %
MAN DIFF?: NORMAL
MCHC RBC-ENTMCNC: 30 PG
MCHC RBC-ENTMCNC: 32.8 GM/DL
MCV RBC AUTO: 91.3 FL
MONOCYTES # BLD AUTO: 0.65 K/UL
MONOCYTES NFR BLD AUTO: 9.5 %
NEUTROPHILS # BLD AUTO: 3.35 K/UL
NEUTROPHILS NFR BLD AUTO: 48.7 %
PLATELET # BLD AUTO: 191 K/UL
POTASSIUM SERPL-SCNC: 4.4 MMOL/L
PROT SERPL-MCNC: 7 G/DL
RBC # BLD: 4.27 M/UL
RBC # FLD: 13.7 %
SODIUM SERPL-SCNC: 142 MMOL/L
TRIGL SERPL-MCNC: 235 MG/DL
TSH SERPL-ACNC: 3.13 UIU/ML
WBC # FLD AUTO: 6.87 K/UL

## 2020-05-27 ENCOUNTER — RX RENEWAL (OUTPATIENT)
Age: 60
End: 2020-05-27

## 2020-07-08 ENCOUNTER — TRANSCRIPTION ENCOUNTER (OUTPATIENT)
Age: 60
End: 2020-07-08

## 2020-07-20 ENCOUNTER — APPOINTMENT (OUTPATIENT)
Dept: CARDIOLOGY | Facility: CLINIC | Age: 60
End: 2020-07-20
Payer: COMMERCIAL

## 2020-07-20 ENCOUNTER — NON-APPOINTMENT (OUTPATIENT)
Age: 60
End: 2020-07-20

## 2020-07-20 VITALS
DIASTOLIC BLOOD PRESSURE: 83 MMHG | WEIGHT: 244 LBS | HEART RATE: 63 BPM | HEIGHT: 70 IN | BODY MASS INDEX: 34.93 KG/M2 | SYSTOLIC BLOOD PRESSURE: 136 MMHG | OXYGEN SATURATION: 98 %

## 2020-07-20 PROCEDURE — 99214 OFFICE O/P EST MOD 30 MIN: CPT

## 2020-07-20 PROCEDURE — 93000 ELECTROCARDIOGRAM COMPLETE: CPT

## 2020-07-20 PROCEDURE — 36415 COLL VENOUS BLD VENIPUNCTURE: CPT

## 2020-07-20 NOTE — HISTORY OF PRESENT ILLNESS
[FreeTextEntry1] : March 5, 2019. The patient is a 58-year-old, overweight man in both his father and mother have severe coronary artery disease. The patient himself has a low HDL between 16 and 23 for some time. He also has hypertension for the last few years. That has been treated with diltiazem 300 and losartan//25. No diabetes. No cigarette smoking. He tends to delay medical care. He thinks the last few months and maybe as far back as 6. He gets pressure in his chest, which radiates to his left thumb when he is exerting himself alone. Some shortness of breath. This has progressed to where her symptoms are coming with virtually any exertion. The last week or so. No rest symptoms. He recently saw pulmonary for one visit and was told that he probably has severe sleep apnea, but no other diagnosis.\par March 8, 2019. Cardiac catheterization revealed totally normal coronary arteries.\par March 11, 2019. Reviewed results with patient. Stop metoprolol and stop aspirin. Continue atorvastatin because of low HDL. Schedule echocardiogram because of shortness of breath with abnormal ECG. Discussed elevated hemoglobin and hematocrit. Will draw erythropoietin level when he returns for his echo and then consider a hematology referral. \par March 27, 2019. Patient returns for followup and echocardiogram. The echo is somewhat suboptimal, but unremarkable. Maybe some minor features of hypertrophic cardiomyopathy with concentric remodeling, chordal MATHEW, diastolic function difficult to determine, systolic function seems okay. Repeat EKG still remains with T wave abnormalities V5 and V6, as well as first degree AV block and  borderline right axis. Will remain on current medications, followup about his sleep apnea, send erythropoietin level about his possible polycythemia, and consider hematology workup. If necessary perhaps consider cardiac MRI in the future.\par September 23, 2019.  Returns for visit as could not get his prescriptions renewed.  Denies any new symptoms or change.  Seems to be asymptomatic and doing well.  Still overweight but weight coming down slowly.\par October 3, 2019. Reviewed labs with patient. Patient claims he has been off losartan HCT for some time now. We agreed to continue off losartan HCT as long as he returns in 2 months for follow-up exam and if blood pressure excellent will continue to stay off of that medicine. \par March 23, 2020 Patient doing okay but somewhat stressed as his brother who was not overweight had a good diet worked out etc. had a heart attack, did get a stent but then about 3 days after discharge had sudden cardiac death at home.  Patient himself is doing well except he has put back his weight and his blood pressure has gone up some.  No cardiac symptoms.  EKG has mildly inverted T waves in V4 through 6 which have been there on some of his previous EKGs on and off.  No other medical issues.\par His A1c was 6.0.  Cholesterol 144, triglycerides 235, HDL 30, LDL 66.  Other labs within normal limits.\par July 20, 2020.  Patient returns in follow-up.  Remains in sinus rhythm.  Anterior T waves now upright change in V1 could be lead placement.  Sinus rhythm at 60.  Denies chest pain or shortness of breath.  No symptoms consistent with COVID-19 or known exposure.  Did lose 7 pounds.

## 2020-07-20 NOTE — REVIEW OF SYSTEMS
[see HPI] : see HPI [Numbness (Hypesthesia)] : numbness [Tingling (Paresthesia)] : tingling [Negative] : Heme/Lymph [Feeling Fatigued] : not feeling fatigued [Recent Weight Gain (___ Lbs)] : no recent weight gain [Recent Weight Loss (___ Lbs)] : recent [unfilled] ~Ulb weight loss [Lower Ext Edema] : no extremity edema [Dyspnea on exertion] : not dyspnea during exertion [Chest  Pressure] : no chest pressure [Cough] : no cough [Leg Claudication] : no intermittent leg claudication [Wheezing] : no wheezing [Coughing Up Blood] : no hemoptysis [Abdominal Pain] : no abdominal pain [Heartburn] : no heartburn [Change in Appetite] : no change in appetite [Memory Lapses Or Loss] : no memory lapses or loss [Dizziness] : no dizziness [Depression] : no depression [Anxiety] : no anxiety [Suicidal] : not suicidal [Easy Bleeding] : no tendency for easy bleeding [Easy Bruising] : no tendency for easy bruising

## 2020-07-20 NOTE — PHYSICAL EXAM
[General Appearance - Well Developed] : well developed [Normal Appearance] : normal appearance [Well Groomed] : well groomed [General Appearance - Well Nourished] : well nourished [No Deformities] : no deformities [General Appearance - In No Acute Distress] : no acute distress [Normal Conjunctiva] : the conjunctiva exhibited no abnormalities [Eyelids - No Xanthelasma] : the eyelids demonstrated no xanthelasmas [No Oral Pallor] : no oral pallor [Normal Oral Mucosa] : normal oral mucosa [No Oral Cyanosis] : no oral cyanosis [Normal Jugular Venous V Waves Present] : normal jugular venous V waves present [No Jugular Venous Coleman A Waves] : no jugular venous coleman A waves [Normal Jugular Venous A Waves Present] : normal jugular venous A waves present [Respiration, Rhythm And Depth] : normal respiratory rhythm and effort [Auscultation Breath Sounds / Voice Sounds] : lungs were clear to auscultation bilaterally [Exaggerated Use Of Accessory Muscles For Inspiration] : no accessory muscle use [Heart Sounds] : normal S1 and S2 [Heart Rate And Rhythm] : heart rate and rhythm were normal [Murmurs] : no murmurs present [Abdomen Soft] : soft [Abdomen Tenderness] : non-tender [Abdomen Mass (___ Cm)] : no abdominal mass palpated [Abnormal Walk] : normal gait [Gait - Sufficient For Exercise Testing] : the gait was sufficient for exercise testing [Cyanosis, Localized] : no localized cyanosis [Nail Clubbing] : no clubbing of the fingernails [Petechial Hemorrhages (___cm)] : no petechial hemorrhages [Skin Color & Pigmentation] : normal skin color and pigmentation [No Venous Stasis] : no venous stasis [] : no rash [No Skin Ulcers] : no skin ulcer [Skin Lesions] : no skin lesions [No Xanthoma] : no  xanthoma was observed [Affect] : the affect was normal [Oriented To Time, Place, And Person] : oriented to person, place, and time [Mood] : the mood was normal [No Anxiety] : not feeling anxious [FreeTextEntry1] : No edema. Pulses 2+ bilaterally, symmetric, including pedal

## 2020-07-21 LAB
ALBUMIN SERPL ELPH-MCNC: 4.6 G/DL
ALP BLD-CCNC: 89 U/L
ALT SERPL-CCNC: 26 U/L
ANION GAP SERPL CALC-SCNC: 13 MMOL/L
AST SERPL-CCNC: 20 U/L
BASOPHILS # BLD AUTO: 0.04 K/UL
BASOPHILS NFR BLD AUTO: 0.8 %
BILIRUB SERPL-MCNC: 0.6 MG/DL
BUN SERPL-MCNC: 22 MG/DL
CALCIUM SERPL-MCNC: 9.4 MG/DL
CHLORIDE SERPL-SCNC: 101 MMOL/L
CHOLEST SERPL-MCNC: 122 MG/DL
CHOLEST/HDLC SERPL: 5.2 RATIO
CO2 SERPL-SCNC: 26 MMOL/L
CREAT SERPL-MCNC: 1.28 MG/DL
EOSINOPHIL # BLD AUTO: 0.18 K/UL
EOSINOPHIL NFR BLD AUTO: 3.5 %
ESTIMATED AVERAGE GLUCOSE: 131 MG/DL
GLUCOSE SERPL-MCNC: 101 MG/DL
HBA1C MFR BLD HPLC: 6.2 %
HCT VFR BLD CALC: 41.4 %
HDLC SERPL-MCNC: 24 MG/DL
HGB BLD-MCNC: 13.6 G/DL
IMM GRANULOCYTES NFR BLD AUTO: 0.2 %
LDLC SERPL CALC-MCNC: 69 MG/DL
LYMPHOCYTES # BLD AUTO: 2.04 K/UL
LYMPHOCYTES NFR BLD AUTO: 39.5 %
MAN DIFF?: NORMAL
MCHC RBC-ENTMCNC: 28.8 PG
MCHC RBC-ENTMCNC: 32.9 GM/DL
MCV RBC AUTO: 87.7 FL
MONOCYTES # BLD AUTO: 0.5 K/UL
MONOCYTES NFR BLD AUTO: 9.7 %
NEUTROPHILS # BLD AUTO: 2.39 K/UL
NEUTROPHILS NFR BLD AUTO: 46.3 %
NT-PROBNP SERPL-MCNC: 12 PG/ML
PLATELET # BLD AUTO: 192 K/UL
POTASSIUM SERPL-SCNC: 4.3 MMOL/L
PROT SERPL-MCNC: 6.9 G/DL
RBC # BLD: 4.72 M/UL
RBC # FLD: 13.4 %
SODIUM SERPL-SCNC: 140 MMOL/L
TRIGL SERPL-MCNC: 147 MG/DL
TSH SERPL-ACNC: 2.59 UIU/ML
WBC # FLD AUTO: 5.16 K/UL

## 2020-08-11 ENCOUNTER — RX RENEWAL (OUTPATIENT)
Age: 60
End: 2020-08-11

## 2020-11-05 ENCOUNTER — RX RENEWAL (OUTPATIENT)
Age: 60
End: 2020-11-05

## 2020-12-08 ENCOUNTER — RX RENEWAL (OUTPATIENT)
Age: 60
End: 2020-12-08

## 2020-12-17 ENCOUNTER — APPOINTMENT (OUTPATIENT)
Dept: CARDIOLOGY | Facility: CLINIC | Age: 60
End: 2020-12-17

## 2021-03-02 ENCOUNTER — RX RENEWAL (OUTPATIENT)
Age: 61
End: 2021-03-02

## 2021-03-14 ENCOUNTER — RX RENEWAL (OUTPATIENT)
Age: 61
End: 2021-03-14

## 2021-04-28 ENCOUNTER — APPOINTMENT (OUTPATIENT)
Dept: CARDIOLOGY | Facility: CLINIC | Age: 61
End: 2021-04-28
Payer: COMMERCIAL

## 2021-04-28 VITALS
SYSTOLIC BLOOD PRESSURE: 130 MMHG | DIASTOLIC BLOOD PRESSURE: 86 MMHG | WEIGHT: 255 LBS | OXYGEN SATURATION: 95 % | TEMPERATURE: 97 F | BODY MASS INDEX: 36.59 KG/M2 | HEART RATE: 99 BPM

## 2021-04-28 PROCEDURE — 93325 DOPPLER ECHO COLOR FLOW MAPG: CPT

## 2021-04-28 PROCEDURE — 99072 ADDL SUPL MATRL&STAF TM PHE: CPT

## 2021-04-28 PROCEDURE — 93320 DOPPLER ECHO COMPLETE: CPT

## 2021-04-28 PROCEDURE — 99214 OFFICE O/P EST MOD 30 MIN: CPT | Mod: 25

## 2021-04-28 PROCEDURE — 36415 COLL VENOUS BLD VENIPUNCTURE: CPT

## 2021-04-28 PROCEDURE — 93351 STRESS TTE COMPLETE: CPT

## 2021-04-28 NOTE — REASON FOR VISIT
[FreeTextEntry1] : 60-year-old man with strong family history of coronary artery disease with exertional chest pressure and shortness of breath for 6 months, normal coronaries on cath..

## 2021-04-28 NOTE — REVIEW OF SYSTEMS
[Weight Gain (___ Lbs)] : [unfilled] ~Ulb weight gain [Dyspnea on exertion] : dyspnea during exertion [Chest Discomfort] : no chest discomfort [Lower Ext Edema] : no extremity edema [Leg Claudication] : no intermittent leg claudication [Palpitations] : no palpitations [Orthopnea] : no orthopnea [PND] : no PND [Syncope] : no syncope [Negative] : Heme/Lymph

## 2021-04-28 NOTE — CARDIOLOGY SUMMARY
[de-identified] : Sinus rhythm with first-degree AV block [de-identified] : 4/28/2021.  Exercised 8 minutes to a heart rate of 139 and a blood pressure of 210/70.  Fatigue and some shortness of breath but no chest pain.  No ST changes.  No VPCs.  No echocardiographic evidence of ischemia. [de-identified] : 3/27/2019, Chordal MATHEW. Minimal MR. No LVOT gradient. Calcified, trileaflet aortic valve with normal opening. No AI. Mild LAE. Endocardium not well visualized. LVEF 55%. Concentric remodeling. Diastolic function difficult to determine. No evidence of infiltrative disease. Normal RV size and function. Minimal TR. Normal pericardium with no effusion ; 4/28/2021 calcified anterior mitral leaflet with chordal S.A.M.  Minimal MR.  Calcified trileaflet aortic valve with normal opening.  No AI.  Aortic root 3.7 cm.  Normal left atrium.  Endocardium not that well visualized but grossly normal LV systolic function with LVEF 70%.  No significant LVOT gradient even with Valsalva.  Concentric remodeling versus borderline LVH.  Normal diastolic function.  Normal RV size and function with mild TR.  RVSP 29.  Limited visualization of pericardium. [de-identified] : 3/8/2019, normal, Right dominant circulation with normal left main, LAD, circumflex, right coronary artery.  [___] : [unfilled] [Normal] : normal [___] : [unfilled]

## 2021-04-28 NOTE — HISTORY OF PRESENT ILLNESS
[FreeTextEntry1] : March 5, 2019. The patient is a 58-year-old, overweight man in both his father and mother have severe coronary artery disease. The patient himself has a low HDL between 16 and 23 for some time. He also has hypertension for the last few years. That has been treated with diltiazem 300 and losartan//25. No diabetes. No cigarette smoking. He tends to delay medical care. He thinks the last few months and maybe as far back as 6. He gets pressure in his chest, which radiates to his left thumb when he is exerting himself alone. Some shortness of breath. This has progressed to where her symptoms are coming with virtually any exertion. The last week or so. No rest symptoms. He recently saw pulmonary for one visit and was told that he probably has severe sleep apnea, but no other diagnosis.\par March 8, 2019. Cardiac catheterization revealed totally normal coronary arteries.\par March 11, 2019. Reviewed results with patient. Stop metoprolol and stop aspirin. Continue atorvastatin because of low HDL. Schedule echocardiogram because of shortness of breath with abnormal ECG. Discussed elevated hemoglobin and hematocrit. Will draw erythropoietin level when he returns for his echo and then consider a hematology referral. \par March 27, 2019. Patient returns for followup and echocardiogram. The echo is somewhat suboptimal, but unremarkable. Maybe some minor features of hypertrophic cardiomyopathy with concentric remodeling, chordal MATHEW, diastolic function difficult to determine, systolic function seems okay. Repeat EKG still remains with T wave abnormalities V5 and V6, as well as first degree AV block and  borderline right axis. Will remain on current medications, followup about his sleep apnea, send erythropoietin level about his possible polycythemia, and consider hematology workup. If necessary perhaps consider cardiac MRI in the future.\par September 23, 2019.  Returns for visit as could not get his prescriptions renewed.  Denies any new symptoms or change.  Seems to be asymptomatic and doing well.  Still overweight but weight coming down slowly.\par October 3, 2019. Reviewed labs with patient. Patient claims he has been off losartan HCT for some time now. We agreed to continue off losartan HCT as long as he returns in 2 months for follow-up exam and if blood pressure excellent will continue to stay off of that medicine. \par March 23, 2020 Patient doing okay but somewhat stressed as his brother who was not overweight had a good diet worked out etc. had a heart attack, did get a stent but then about 3 days after discharge had sudden cardiac death at home.  Patient himself is doing well except he has put back his weight and his blood pressure has gone up some.  No cardiac symptoms.  EKG has mildly inverted T waves in V4 through 6 which have been there on some of his previous EKGs on and off.  No other medical issues.\par His A1c was 6.0.  Cholesterol 144, triglycerides 235, HDL 30, LDL 66.  Other labs within normal limits.\par July 20, 2020.  Patient returns in follow-up.  Remains in sinus rhythm.  Anterior T waves now upright change in V1 could be lead placement.  Sinus rhythm at 60.  Denies chest pain or shortness of breath.  No symptoms consistent with COVID-19 or known exposure.  Did lose 7 pounds.\par April 28, 2021.  Patient returns for follow-up and for stress echocardiogram.  Put back 10 pounds.  Had been going to the bariatric center and is on some medication for weight loss but seems interested in seeing the dietitian here who I referred to him.  Denies chest pain and has shortness of breath probably just from his weight.  His echocardiogram was unchanged with borderline LVH and may be hyperdynamic systolic function but normal diastolic function and no significant valve disease.  He was able to exercise for 8 minutes to a heart rate of 139 and a blood pressure of 210/70.  No chest pain, no ST changes, no echo evidence of ischemia, and no VPCs.  Labs were sent

## 2021-04-29 LAB
ALBUMIN SERPL ELPH-MCNC: 4.9 G/DL
ALP BLD-CCNC: 106 U/L
ALT SERPL-CCNC: 26 U/L
ANION GAP SERPL CALC-SCNC: 20 MMOL/L
AST SERPL-CCNC: 16 U/L
BASOPHILS # BLD AUTO: 0.06 K/UL
BASOPHILS NFR BLD AUTO: 0.8 %
BILIRUB SERPL-MCNC: 0.4 MG/DL
BUN SERPL-MCNC: 17 MG/DL
CALCIUM SERPL-MCNC: 9.5 MG/DL
CHLORIDE SERPL-SCNC: 104 MMOL/L
CHOLEST SERPL-MCNC: 105 MG/DL
CO2 SERPL-SCNC: 16 MMOL/L
CREAT SERPL-MCNC: 1.08 MG/DL
EOSINOPHIL # BLD AUTO: 0.25 K/UL
EOSINOPHIL NFR BLD AUTO: 3.3 %
ESTIMATED AVERAGE GLUCOSE: 131 MG/DL
GLUCOSE SERPL-MCNC: 101 MG/DL
HBA1C MFR BLD HPLC: 6.2 %
HCT VFR BLD CALC: 50.3 %
HDLC SERPL-MCNC: 24 MG/DL
HGB BLD-MCNC: 16.5 G/DL
IMM GRANULOCYTES NFR BLD AUTO: 0.4 %
LDLC SERPL CALC-MCNC: 50 MG/DL
LYMPHOCYTES # BLD AUTO: 1.98 K/UL
LYMPHOCYTES NFR BLD AUTO: 25.9 %
MAN DIFF?: NORMAL
MCHC RBC-ENTMCNC: 28.6 PG
MCHC RBC-ENTMCNC: 32.8 GM/DL
MCV RBC AUTO: 87.2 FL
MONOCYTES # BLD AUTO: 0.85 K/UL
MONOCYTES NFR BLD AUTO: 11.1 %
NEUTROPHILS # BLD AUTO: 4.47 K/UL
NEUTROPHILS NFR BLD AUTO: 58.5 %
NONHDLC SERPL-MCNC: 81 MG/DL
NT-PROBNP SERPL-MCNC: 6 PG/ML
PLATELET # BLD AUTO: 212 K/UL
POTASSIUM SERPL-SCNC: 4.4 MMOL/L
PROT SERPL-MCNC: 7.1 G/DL
RBC # BLD: 5.77 M/UL
RBC # FLD: 15.1 %
SODIUM SERPL-SCNC: 141 MMOL/L
TRIGL SERPL-MCNC: 152 MG/DL
WBC # FLD AUTO: 7.64 K/UL

## 2021-04-30 ENCOUNTER — APPOINTMENT (OUTPATIENT)
Dept: CARDIOLOGY | Facility: CLINIC | Age: 61
End: 2021-04-30

## 2021-06-15 ENCOUNTER — RX RENEWAL (OUTPATIENT)
Age: 61
End: 2021-06-15

## 2021-09-12 ENCOUNTER — RX RENEWAL (OUTPATIENT)
Age: 61
End: 2021-09-12

## 2021-09-12 RX ORDER — DILTIAZEM HYDROCHLORIDE 300 MG/1
300 CAPSULE, EXTENDED RELEASE ORAL
Qty: 90 | Refills: 0 | Status: ACTIVE | COMMUNITY
Start: 2020-02-18 | End: 1900-01-01

## 2021-12-08 ENCOUNTER — RX RENEWAL (OUTPATIENT)
Age: 61
End: 2021-12-08

## 2021-12-16 ENCOUNTER — RX RENEWAL (OUTPATIENT)
Age: 61
End: 2021-12-16

## 2021-12-26 ENCOUNTER — RX RENEWAL (OUTPATIENT)
Age: 61
End: 2021-12-26

## 2022-01-11 ENCOUNTER — RX RENEWAL (OUTPATIENT)
Age: 62
End: 2022-01-11

## 2022-01-24 ENCOUNTER — NON-APPOINTMENT (OUTPATIENT)
Age: 62
End: 2022-01-24

## 2022-01-24 ENCOUNTER — LABORATORY RESULT (OUTPATIENT)
Age: 62
End: 2022-01-24

## 2022-01-24 ENCOUNTER — APPOINTMENT (OUTPATIENT)
Dept: CARDIOLOGY | Facility: CLINIC | Age: 62
End: 2022-01-24
Payer: COMMERCIAL

## 2022-01-24 VITALS
HEIGHT: 70 IN | WEIGHT: 246 LBS | BODY MASS INDEX: 35.22 KG/M2 | SYSTOLIC BLOOD PRESSURE: 138 MMHG | OXYGEN SATURATION: 96 % | DIASTOLIC BLOOD PRESSURE: 87 MMHG | HEART RATE: 82 BPM

## 2022-01-24 DIAGNOSIS — G62.9 POLYNEUROPATHY, UNSPECIFIED: ICD-10-CM

## 2022-01-24 PROCEDURE — 36415 COLL VENOUS BLD VENIPUNCTURE: CPT

## 2022-01-24 PROCEDURE — 93000 ELECTROCARDIOGRAM COMPLETE: CPT

## 2022-01-24 PROCEDURE — 99214 OFFICE O/P EST MOD 30 MIN: CPT

## 2022-01-24 NOTE — HISTORY OF PRESENT ILLNESS
[FreeTextEntry1] : March 5, 2019. The patient is a 58-year-old, overweight man in both his father and mother have severe coronary artery disease. The patient himself has a low HDL between 16 and 23 for some time. He also has hypertension for the last few years. That has been treated with diltiazem 300 and losartan//25. No diabetes. No cigarette smoking. He tends to delay medical care. He thinks the last few months and maybe as far back as 6. He gets pressure in his chest, which radiates to his left thumb when he is exerting himself alone. Some shortness of breath. This has progressed to where her symptoms are coming with virtually any exertion. The last week or so. No rest symptoms. He recently saw pulmonary for one visit and was told that he probably has severe sleep apnea, but no other diagnosis.\par March 8, 2019. Cardiac catheterization revealed totally normal coronary arteries.\par March 11, 2019. Reviewed results with patient. Stop metoprolol and stop aspirin. Continue atorvastatin because of low HDL. Schedule echocardiogram because of shortness of breath with abnormal ECG. Discussed elevated hemoglobin and hematocrit. Will draw erythropoietin level when he returns for his echo and then consider a hematology referral. \par March 27, 2019. Patient returns for followup and echocardiogram. The echo is somewhat suboptimal, but unremarkable. Maybe some minor features of hypertrophic cardiomyopathy with concentric remodeling, chordal MATHEW, diastolic function difficult to determine, systolic function seems okay. Repeat EKG still remains with T wave abnormalities V5 and V6, as well as first degree AV block and  borderline right axis. Will remain on current medications, followup about his sleep apnea, send erythropoietin level about his possible polycythemia, and consider hematology workup. If necessary perhaps consider cardiac MRI in the future.\par September 23, 2019.  Returns for visit as could not get his prescriptions renewed.  Denies any new symptoms or change.  Seems to be asymptomatic and doing well.  Still overweight but weight coming down slowly.\par October 3, 2019. Reviewed labs with patient. Patient claims he has been off losartan HCT for some time now. We agreed to continue off losartan HCT as long as he returns in 2 months for follow-up exam and if blood pressure excellent will continue to stay off of that medicine. \par March 23, 2020 Patient doing okay but somewhat stressed as his brother who was not overweight had a good diet worked out etc. had a heart attack, did get a stent but then about 3 days after discharge had sudden cardiac death at home.  Patient himself is doing well except he has put back his weight and his blood pressure has gone up some.  No cardiac symptoms.  EKG has mildly inverted T waves in V4 through 6 which have been there on some of his previous EKGs on and off.  No other medical issues.\par His A1c was 6.0.  Cholesterol 144, triglycerides 235, HDL 30, LDL 66.  Other labs within normal limits.\par July 20, 2020.  Patient returns in follow-up.  Remains in sinus rhythm.  Anterior T waves now upright change in V1 could be lead placement.  Sinus rhythm at 60.  Denies chest pain or shortness of breath.  No symptoms consistent with COVID-19 or known exposure.  Did lose 7 pounds.\par April 28, 2021.  Patient returns for follow-up and for stress echocardiogram.  Put back 10 pounds.  Had been going to the bariatric center and is on some medication for weight loss but seems interested in seeing the dietitian here who I referred to him.  Denies chest pain and has shortness of breath probably just from his weight.  His echocardiogram was unchanged with borderline LVH and may be hyperdynamic systolic function but normal diastolic function and no significant valve disease.  He was able to exercise for 8 minutes to a heart rate of 139 and a blood pressure of 210/70.  No chest pain, no ST changes, no echo evidence of ischemia, and no VPCs.  Labs were sent--hemoglobin 16.5 with MCV 50.3.  Normal chemistries including BUN 17 creatinine 1.08.  Cholesterol 105, triglycerides 152, HDL 24, LDL 50.  Globin A1c 6.2.\par January 24, 2022.  First visit since last April.  Here together with his mother.  EKG is sinus rhythm at 86 with an axis of +90 but otherwise unremarkable.  Weight down 9 pounds.  Unfortunately he ran out of diltiazem and atorvastatin at the beginning of December.  Blood pressure little high here.  No interval medical issues, emergency room visits etc.  Had Covid December 2020 and since then has been vaccinated and booster along with flu shot.  Will be getting  sometime this year when his bride gets here from [facility].

## 2022-01-24 NOTE — CARDIOLOGY SUMMARY
[___] : [unfilled] [Normal] : normal [de-identified] : Sinus rhythm with first-degree AV block [de-identified] : 4/28/2021.  Exercised 8 minutes to a heart rate of 139 and a blood pressure of 210/70.  Fatigue and some shortness of breath but no chest pain.  No ST changes.  No VPCs.  No echocardiographic evidence of ischemia. [de-identified] : 3/27/2019, Chordal MATHEW. Minimal MR. No LVOT gradient. Calcified, trileaflet aortic valve with normal opening. No AI. Mild LAE. Endocardium not well visualized. LVEF 55%. Concentric remodeling. Diastolic function difficult to determine. No evidence of infiltrative disease. Normal RV size and function. Minimal TR. Normal pericardium with no effusion ; 4/28/2021 calcified anterior mitral leaflet with chordal S.A.M.  Minimal MR.  Calcified trileaflet aortic valve with normal opening.  No AI.  Aortic root 3.7 cm.  Normal left atrium.  Endocardium not that well visualized but grossly normal LV systolic function with LVEF 70%.  No significant LVOT gradient even with Valsalva.  Concentric remodeling versus borderline LVH.  Normal diastolic function.  Normal RV size and function with mild TR.  RVSP 29.  Limited visualization of pericardium. [de-identified] : 3/8/2019, normal, Right dominant circulation with normal left main, LAD, circumflex, right coronary artery.

## 2022-01-24 NOTE — REVIEW OF SYSTEMS
[Dyspnea on exertion] : dyspnea during exertion [Negative] : Heme/Lymph [Weight Loss (___ Lbs)] : [unfilled] ~Ulb weight loss [Weight Gain (___ Lbs)] : no recent weight gain [Chest Discomfort] : no chest discomfort [Lower Ext Edema] : no extremity edema [Leg Claudication] : no intermittent leg claudication [Palpitations] : no palpitations [Orthopnea] : no orthopnea [PND] : no PND [Syncope] : no syncope

## 2022-01-24 NOTE — PHYSICAL EXAM
[General Appearance - Well Developed] : well developed [Normal Appearance] : normal appearance [Well Groomed] : well groomed [General Appearance - Well Nourished] : well nourished [No Deformities] : no deformities [General Appearance - In No Acute Distress] : no acute distress [Normal Conjunctiva] : the conjunctiva exhibited no abnormalities [Eyelids - No Xanthelasma] : the eyelids demonstrated no xanthelasmas [Normal Oral Mucosa] : normal oral mucosa [No Oral Pallor] : no oral pallor [No Oral Cyanosis] : no oral cyanosis [Normal Jugular Venous A Waves Present] : normal jugular venous A waves present [Normal Jugular Venous V Waves Present] : normal jugular venous V waves present [No Jugular Venous Coleman A Waves] : no jugular venous ocleman A waves [Respiration, Rhythm And Depth] : normal respiratory rhythm and effort [Exaggerated Use Of Accessory Muscles For Inspiration] : no accessory muscle use [Auscultation Breath Sounds / Voice Sounds] : lungs were clear to auscultation bilaterally [Heart Rate And Rhythm] : heart rate and rhythm were normal [Heart Sounds] : normal S1 and S2 [Murmurs] : no murmurs present [Abdomen Soft] : soft [Abdomen Tenderness] : non-tender [Abdomen Mass (___ Cm)] : no abdominal mass palpated [Abnormal Walk] : normal gait [Gait - Sufficient For Exercise Testing] : the gait was sufficient for exercise testing [Nail Clubbing] : no clubbing of the fingernails [Cyanosis, Localized] : no localized cyanosis [Petechial Hemorrhages (___cm)] : no petechial hemorrhages [Skin Color & Pigmentation] : normal skin color and pigmentation [] : no rash [No Venous Stasis] : no venous stasis [Skin Lesions] : no skin lesions [No Skin Ulcers] : no skin ulcer [No Xanthoma] : no  xanthoma was observed [Oriented To Time, Place, And Person] : oriented to person, place, and time [Affect] : the affect was normal [Mood] : the mood was normal [No Anxiety] : not feeling anxious [FreeTextEntry1] : No edema. Pulses 2+ bilaterally, symmetric, including pedal

## 2022-01-24 NOTE — REASON FOR VISIT
[FreeTextEntry1] : 61-year-old man with strong family history of coronary artery disease with exertional chest pressure and shortness of breath for 6 months, normal coronaries on cath..

## 2022-01-25 LAB
ALBUMIN SERPL ELPH-MCNC: 4.3 G/DL
ALP BLD-CCNC: 66 U/L
ALT SERPL-CCNC: 42 U/L
ANION GAP SERPL CALC-SCNC: 14 MMOL/L
AST SERPL-CCNC: 21 U/L
BASOPHILS # BLD AUTO: 0.03 K/UL
BASOPHILS NFR BLD AUTO: 0.4 %
BILIRUB SERPL-MCNC: 0.6 MG/DL
BUN SERPL-MCNC: 21 MG/DL
CALCIUM SERPL-MCNC: 8 MG/DL
CHLORIDE SERPL-SCNC: 102 MMOL/L
CHOLEST SERPL-MCNC: 148 MG/DL
CO2 SERPL-SCNC: 21 MMOL/L
CREAT SERPL-MCNC: 1.1 MG/DL
EOSINOPHIL # BLD AUTO: 0.23 K/UL
EOSINOPHIL NFR BLD AUTO: 3.2 %
ESTIMATED AVERAGE GLUCOSE: 120 MG/DL
GLUCOSE SERPL-MCNC: 83 MG/DL
HBA1C MFR BLD HPLC: 5.8 %
HCT VFR BLD CALC: 55 %
HDLC SERPL-MCNC: 20 MG/DL
HGB BLD-MCNC: 18.1 G/DL
IMM GRANULOCYTES NFR BLD AUTO: 0.1 %
LDLC SERPL CALC-MCNC: 93 MG/DL
LYMPHOCYTES # BLD AUTO: 2.43 K/UL
LYMPHOCYTES NFR BLD AUTO: 33.8 %
MAN DIFF?: NORMAL
MCHC RBC-ENTMCNC: 28.8 PG
MCHC RBC-ENTMCNC: 32.9 GM/DL
MCV RBC AUTO: 87.4 FL
MONOCYTES # BLD AUTO: 0.88 K/UL
MONOCYTES NFR BLD AUTO: 12.3 %
NEUTROPHILS # BLD AUTO: 3.6 K/UL
NEUTROPHILS NFR BLD AUTO: 50.2 %
NONHDLC SERPL-MCNC: 128 MG/DL
PLATELET # BLD AUTO: 196 K/UL
POTASSIUM SERPL-SCNC: 4 MMOL/L
PROT SERPL-MCNC: 6.6 G/DL
RBC # BLD: 6.29 M/UL
RBC # FLD: 16.4 %
SODIUM SERPL-SCNC: 138 MMOL/L
TRIGL SERPL-MCNC: 173 MG/DL
WBC # FLD AUTO: 7.18 K/UL

## 2022-01-26 LAB
DEPRECATED KAPPA LC FREE/LAMBDA SER: 0.86 RATIO
KAPPA LC CSF-MCNC: 1.63 MG/DL
KAPPA LC SERPL-MCNC: 1.4 MG/DL

## 2022-01-30 LAB
ALBUMIN MFR SERPL ELPH: 60.5 %
ALBUMIN SERPL-MCNC: 4 G/DL
ALBUMIN/GLOB SERPL: 1.5 RATIO
ALPHA1 GLOB MFR SERPL ELPH: 5.4 %
ALPHA1 GLOB SERPL ELPH-MCNC: 0.4 G/DL
ALPHA2 GLOB MFR SERPL ELPH: 10.9 %
ALPHA2 GLOB SERPL ELPH-MCNC: 0.7 G/DL
B-GLOBULIN MFR SERPL ELPH: 11.9 %
B-GLOBULIN SERPL ELPH-MCNC: 0.8 G/DL
DEPRECATED KAPPA LC FREE/LAMBDA SER: 0.86 RATIO
GAMMA GLOB FLD ELPH-MCNC: 0.7 G/DL
GAMMA GLOB MFR SERPL ELPH: 11.3 %
IGA SER QL IEP: 210 MG/DL
IGG SER QL IEP: 829 MG/DL
IGM SER QL IEP: 79 MG/DL
INTERPRETATION SERPL IEP-IMP: NORMAL
KAPPA LC CSF-MCNC: 1.63 MG/DL
KAPPA LC SERPL-MCNC: 1.4 MG/DL
M PROTEIN SPEC IFE-MCNC: NORMAL
PROT SERPL-MCNC: 6.6 G/DL
PROT SERPL-MCNC: 6.6 G/DL

## 2022-03-07 ENCOUNTER — NON-APPOINTMENT (OUTPATIENT)
Age: 62
End: 2022-03-07

## 2022-03-07 ENCOUNTER — APPOINTMENT (OUTPATIENT)
Dept: CARDIOLOGY | Facility: CLINIC | Age: 62
End: 2022-03-07
Payer: COMMERCIAL

## 2022-03-07 VITALS
WEIGHT: 250 LBS | DIASTOLIC BLOOD PRESSURE: 86 MMHG | OXYGEN SATURATION: 97 % | BODY MASS INDEX: 35.87 KG/M2 | SYSTOLIC BLOOD PRESSURE: 150 MMHG | HEART RATE: 75 BPM

## 2022-03-07 VITALS — DIASTOLIC BLOOD PRESSURE: 88 MMHG | SYSTOLIC BLOOD PRESSURE: 154 MMHG | HEART RATE: 72 BPM

## 2022-03-07 DIAGNOSIS — E66.9 OBESITY, UNSPECIFIED: ICD-10-CM

## 2022-03-07 DIAGNOSIS — R94.31 ABNORMAL ELECTROCARDIOGRAM [ECG] [EKG]: ICD-10-CM

## 2022-03-07 PROCEDURE — 93000 ELECTROCARDIOGRAM COMPLETE: CPT

## 2022-03-07 PROCEDURE — 99215 OFFICE O/P EST HI 40 MIN: CPT

## 2022-03-07 NOTE — DISCUSSION/SUMMARY
[Procedure Intermediate Risk] : the procedure risk is intermediate [Additional Diagnostics Recommended] : additional diagnostics recommended [FreeTextEntry1] : 61-year-old man with family history of coronary disease, risk factors as well, history of abnormal ECG yet in 2019 with those had totally normal coronary arteries.  Has a component of sleep apnea that is mild and he does not tolerate CPAP.  Blood pressure had been well controlled but is a little elevated today.  EKG today mildly abnormal compared with the normal one in January of this year.  He will return for a stress echocardiogram and depending on those results will be further evaluated for bariatric surgery.

## 2022-03-31 ENCOUNTER — APPOINTMENT (OUTPATIENT)
Dept: CARDIOLOGY | Facility: CLINIC | Age: 62
End: 2022-03-31
Payer: COMMERCIAL

## 2022-03-31 PROCEDURE — 93351 STRESS TTE COMPLETE: CPT

## 2022-03-31 NOTE — HISTORY OF PRESENT ILLNESS
[Preoperative Visit] : for a medical evaluation prior to surgery [Scheduled Procedure ___] : a [unfilled] [Date of Surgery ___] : on [unfilled] [Surgeon Name ___] : surgeon: [unfilled] [Good] : Good [Fever] : no fever [Chills] : no chills [Fatigue] : no fatigue [Chest Pain] : no chest pain [Cough] : no cough [Dyspnea] : dyspnea [Dysuria] : no dysuria [Urinary Frequency] : no urinary frequency [Nausea] : no nausea [Vomiting] : no vomiting [Diarrhea] : no diarrhea [Abdominal Pain] : no abdominal pain [Easy Bruising] : no easy bruising [Lower Extremity Swelling] : no lower extremity swelling [Poor Exercise Tolerance] : no poor exercise tolerance [Diabetes] : no diabetes [Cardiovascular Disease] : no cardiovascular disease [Pulmonary Disease] : pulmonary disease [Anti-Platelet Agents] : no anti-platelet agents [Nicotine Dependence] : no nicotine dependence [Alcohol Use] : no  alcohol use [Renal Disease] : no renal disease [GI Disease] : no gastrointestinal disease [Sleep Apnea] : sleep apnea [Thromboembolic Problems] : no thromboembolic problems [Frequent use of NSAIDs] : no use of NSAIDs [Transfusion Reaction] : no transfusion reaction [Impaired Immunity] : no impaired immunity [Steroid Use in Last 6 Months] : no steroid use in the last six months [Frequent Aspirin Use] : no frequent aspirin use [Prior Anesthesia] : Prior anesthesia [Prev Anesthesia Reaction] : no previous anesthesia reaction [Electrocardiogram] : ~T an ECG ~C was performed [Echocardiogram] : ~T an echocardiogram ~C was performed [Cardiac Catheterization  (Diagnostic)] : cardiac catheterization ~T ~C was performed [Cardiovascular Stress Test] : a cardiac stress test ~T ~C was performed [Anesthesia Reaction] : no anesthesia reaction [Sudden Death] : sudden death [Clotting Disorder] : no clotting disorder [Bleeding Disorder] : no bleeding disorder [de-identified] : [facility] [de-identified] : Brother with sudden death at home within 1 week of discharge from hospital after acute MI [FreeTextEntry1] : March 5, 2019. The patient is a 58-year-old, overweight man in both his father and mother have severe coronary artery disease. The patient himself has a low HDL between 16 and 23 for some time. He also has hypertension for the last few years. That has been treated with diltiazem 300 and losartan//25. No diabetes. No cigarette smoking. He tends to delay medical care. He thinks the last few months and maybe as far back as 6. He gets pressure in his chest, which radiates to his left thumb when he is exerting himself alone. Some shortness of breath. This has progressed to where her symptoms are coming with virtually any exertion. The last week or so. No rest symptoms. He recently saw pulmonary for one visit and was told that he probably has severe sleep apnea, but no other diagnosis.\par March 8, 2019. Cardiac catheterization revealed totally normal coronary arteries.\par March 11, 2019. Reviewed results with patient. Stop metoprolol and stop aspirin. Continue atorvastatin because of low HDL. Schedule echocardiogram because of shortness of breath with abnormal ECG. Discussed elevated hemoglobin and hematocrit. Will draw erythropoietin level when he returns for his echo and then consider a hematology referral. \par March 27, 2019. Patient returns for followup and echocardiogram. The echo is somewhat suboptimal, but unremarkable. Maybe some minor features of hypertrophic cardiomyopathy with concentric remodeling, chordal MATHEW, diastolic function difficult to determine, systolic function seems okay. Repeat EKG still remains with T wave abnormalities V5 and V6, as well as first degree AV block and  borderline right axis. Will remain on current medications, followup about his sleep apnea, send erythropoietin level about his possible polycythemia, and consider hematology workup. If necessary perhaps consider cardiac MRI in the future.\par September 23, 2019.  Returns for visit as could not get his prescriptions renewed.  Denies any new symptoms or change.  Seems to be asymptomatic and doing well.  Still overweight but weight coming down slowly.\par October 3, 2019. Reviewed labs with patient. Patient claims he has been off losartan HCT for some time now. We agreed to continue off losartan HCT as long as he returns in 2 months for follow-up exam and if blood pressure excellent will continue to stay off of that medicine. \par March 23, 2020 Patient doing okay but somewhat stressed as his brother who was not overweight had a good diet worked out etc. had a heart attack, did get a stent but then about 3 days after discharge had sudden cardiac death at home.  Patient himself is doing well except he has put back his weight and his blood pressure has gone up some.  No cardiac symptoms.  EKG has mildly inverted T waves in V4 through 6 which have been there on some of his previous EKGs on and off.  No other medical issues.\par His A1c was 6.0.  Cholesterol 144, triglycerides 235, HDL 30, LDL 66.  Other labs within normal limits.\par July 20, 2020.  Patient returns in follow-up.  Remains in sinus rhythm.  Anterior T waves now upright change in V1 could be lead placement.  Sinus rhythm at 60.  Denies chest pain or shortness of breath.  No symptoms consistent with COVID-19 or known exposure.  Did lose 7 pounds.\par April 28, 2021.  Patient returns for follow-up and for stress echocardiogram.  Put back 10 pounds.  Had been going to the bariatric center and is on some medication for weight loss but seems interested in seeing the dietitian here who I referred to him.  Denies chest pain and has shortness of breath probably just from his weight.  His echocardiogram was unchanged with borderline LVH and may be hyperdynamic systolic function but normal diastolic function and no significant valve disease.  He was able to exercise for 8 minutes to a heart rate of 139 and a blood pressure of 210/70.  No chest pain, no ST changes, no echo evidence of ischemia, and no VPCs.  Labs were sent--hemoglobin 16.5 with MCV 50.3.  Normal chemistries including BUN 17 creatinine 1.08.  Cholesterol 105, triglycerides 152, HDL 24, LDL 50.  Globin A1c 6.2.\par January 24, 2022.  First visit since last April.  Here together with his mother.  EKG is sinus rhythm at 86 with an axis of +90 but otherwise unremarkable.  Weight down 9 pounds.  Unfortunately he ran out of diltiazem and atorvastatin at the beginning of December.  Blood pressure little high here.  No interval medical issues, emergency room visits etc.  Had Covid December 2020 and since then has been vaccinated and booster along with flu shot.  Will be getting  sometime this year when his bride gets here from [facility].\par March 7, 2022.  Patient returns in follow-up.  Does have new mild T wave inversions in V5 and V6.\par March 31, 2022. Patient return for stress echo.  With pushing was able to do the full 9 minutes at which point his heart rate was only 137, blood pressure was 168/84, shortness of breath but no chest pain.  Initial EKG no different than previously with minimal nonsignificant ST changes but then in recovery did become horizontal and then downsloping with T wave inversions and these were more prominent and lasted longer than the previous stress echo.  Echocardiographic wall motion analysis post exercise however clearly showed hyper dynamic LV with no new wall motion changes.  Endocardium not that well visualized however.  No change for now but will probably do a nuclear stress test next time.  Will need a reevaluation within 30 days of bariatric surgery should he be approved and schedule it. \par \par

## 2022-03-31 NOTE — CARDIOLOGY SUMMARY
[___] : [unfilled] [Normal] : normal [___] : [unfilled] [de-identified] : Sinus rhythm with first-degree AV block [de-identified] : 4/28/2021.  Exercised 8 minutes to a heart rate of 139 and a blood pressure of 210/70.  Fatigue and some shortness of breath but no chest pain.  No ST changes.  No VPCs.  No echocardiographic evidence of ischemia. ;  March 31, 2022.  Exercised 9 minutes to a heart rate of 137 and a blood pressure of 168/84.  Fatigue and shortness of breath but no chest pain.  Initially just mild nonspecific ST changes but these became horizontal and even downsloping with T wave inversions in recovery lasting more than 7-1/2 minutes.  Similar but milder changes were noted on the last stress test in April 2021 but not as impressive but did not last as long.  Still no echocardiographic evidence of ischemia (endocardium not that well visualized) [de-identified] : 3/27/2019, Chordal MATHEW. Minimal MR. No LVOT gradient. Calcified, trileaflet aortic valve with normal opening. No AI. Mild LAE. Endocardium not well visualized. LVEF 55%. Concentric remodeling. Diastolic function difficult to determine. No evidence of infiltrative disease. Normal RV size and function. Minimal TR. Normal pericardium with no effusion ; 4/28/2021 calcified anterior mitral leaflet with chordal S.A.M.  Minimal MR.  Calcified trileaflet aortic valve with normal opening.  No AI.  Aortic root 3.7 cm.  Normal left atrium.  Endocardium not that well visualized but grossly normal LV systolic function with LVEF 70%.  No significant LVOT gradient even with Valsalva.  Concentric remodeling versus borderline LVH.  Normal diastolic function.  Normal RV size and function with mild TR.  RVSP 29.  Limited visualization of pericardium. [de-identified] : 3/8/2019, normal, Right dominant circulation with normal left main, LAD, circumflex, right coronary artery.

## 2022-03-31 NOTE — HISTORY OF PRESENT ILLNESS
[Preoperative Visit] : for a medical evaluation prior to surgery [Scheduled Procedure ___] : a [unfilled] [Date of Surgery ___] : on [unfilled] [Surgeon Name ___] : surgeon: [unfilled] [Good] : Good [Fever] : no fever [Chills] : no chills [Fatigue] : no fatigue [Chest Pain] : no chest pain [Cough] : no cough [Dyspnea] : dyspnea [Dysuria] : no dysuria [Urinary Frequency] : no urinary frequency [Nausea] : no nausea [Vomiting] : no vomiting [Diarrhea] : no diarrhea [Abdominal Pain] : no abdominal pain [Easy Bruising] : no easy bruising [Lower Extremity Swelling] : no lower extremity swelling [Poor Exercise Tolerance] : no poor exercise tolerance [Diabetes] : no diabetes [Cardiovascular Disease] : no cardiovascular disease [Pulmonary Disease] : pulmonary disease [Anti-Platelet Agents] : no anti-platelet agents [Nicotine Dependence] : no nicotine dependence [Alcohol Use] : no  alcohol use [Renal Disease] : no renal disease [GI Disease] : no gastrointestinal disease [Sleep Apnea] : sleep apnea [Thromboembolic Problems] : no thromboembolic problems [Frequent use of NSAIDs] : no use of NSAIDs [Transfusion Reaction] : no transfusion reaction [Impaired Immunity] : no impaired immunity [Steroid Use in Last 6 Months] : no steroid use in the last six months [Frequent Aspirin Use] : no frequent aspirin use [Prior Anesthesia] : Prior anesthesia [Prev Anesthesia Reaction] : no previous anesthesia reaction [Electrocardiogram] : ~T an ECG ~C was performed [Echocardiogram] : ~T an echocardiogram ~C was performed [Cardiac Catheterization  (Diagnostic)] : cardiac catheterization ~T ~C was performed [Cardiovascular Stress Test] : a cardiac stress test ~T ~C was performed [Anesthesia Reaction] : no anesthesia reaction [Sudden Death] : sudden death [Clotting Disorder] : no clotting disorder [Bleeding Disorder] : no bleeding disorder [de-identified] : [facility] [de-identified] : Brother with sudden death at home within 1 week of discharge from hospital after acute MI [FreeTextEntry1] : March 5, 2019. The patient is a 58-year-old, overweight man in both his father and mother have severe coronary artery disease. The patient himself has a low HDL between 16 and 23 for some time. He also has hypertension for the last few years. That has been treated with diltiazem 300 and losartan//25. No diabetes. No cigarette smoking. He tends to delay medical care. He thinks the last few months and maybe as far back as 6. He gets pressure in his chest, which radiates to his left thumb when he is exerting himself alone. Some shortness of breath. This has progressed to where her symptoms are coming with virtually any exertion. The last week or so. No rest symptoms. He recently saw pulmonary for one visit and was told that he probably has severe sleep apnea, but no other diagnosis.\par March 8, 2019. Cardiac catheterization revealed totally normal coronary arteries.\par March 11, 2019. Reviewed results with patient. Stop metoprolol and stop aspirin. Continue atorvastatin because of low HDL. Schedule echocardiogram because of shortness of breath with abnormal ECG. Discussed elevated hemoglobin and hematocrit. Will draw erythropoietin level when he returns for his echo and then consider a hematology referral. \par March 27, 2019. Patient returns for followup and echocardiogram. The echo is somewhat suboptimal, but unremarkable. Maybe some minor features of hypertrophic cardiomyopathy with concentric remodeling, chordal MATHEW, diastolic function difficult to determine, systolic function seems okay. Repeat EKG still remains with T wave abnormalities V5 and V6, as well as first degree AV block and  borderline right axis. Will remain on current medications, followup about his sleep apnea, send erythropoietin level about his possible polycythemia, and consider hematology workup. If necessary perhaps consider cardiac MRI in the future.\par September 23, 2019.  Returns for visit as could not get his prescriptions renewed.  Denies any new symptoms or change.  Seems to be asymptomatic and doing well.  Still overweight but weight coming down slowly.\par October 3, 2019. Reviewed labs with patient. Patient claims he has been off losartan HCT for some time now. We agreed to continue off losartan HCT as long as he returns in 2 months for follow-up exam and if blood pressure excellent will continue to stay off of that medicine. \par March 23, 2020 Patient doing okay but somewhat stressed as his brother who was not overweight had a good diet worked out etc. had a heart attack, did get a stent but then about 3 days after discharge had sudden cardiac death at home.  Patient himself is doing well except he has put back his weight and his blood pressure has gone up some.  No cardiac symptoms.  EKG has mildly inverted T waves in V4 through 6 which have been there on some of his previous EKGs on and off.  No other medical issues.\par His A1c was 6.0.  Cholesterol 144, triglycerides 235, HDL 30, LDL 66.  Other labs within normal limits.\par July 20, 2020.  Patient returns in follow-up.  Remains in sinus rhythm.  Anterior T waves now upright change in V1 could be lead placement.  Sinus rhythm at 60.  Denies chest pain or shortness of breath.  No symptoms consistent with COVID-19 or known exposure.  Did lose 7 pounds.\par April 28, 2021.  Patient returns for follow-up and for stress echocardiogram.  Put back 10 pounds.  Had been going to the bariatric center and is on some medication for weight loss but seems interested in seeing the dietitian here who I referred to him.  Denies chest pain and has shortness of breath probably just from his weight.  His echocardiogram was unchanged with borderline LVH and may be hyperdynamic systolic function but normal diastolic function and no significant valve disease.  He was able to exercise for 8 minutes to a heart rate of 139 and a blood pressure of 210/70.  No chest pain, no ST changes, no echo evidence of ischemia, and no VPCs.  Labs were sent--hemoglobin 16.5 with MCV 50.3.  Normal chemistries including BUN 17 creatinine 1.08.  Cholesterol 105, triglycerides 152, HDL 24, LDL 50.  Globin A1c 6.2.\par January 24, 2022.  First visit since last April.  Here together with his mother.  EKG is sinus rhythm at 86 with an axis of +90 but otherwise unremarkable.  Weight down 9 pounds.  Unfortunately he ran out of diltiazem and atorvastatin at the beginning of December.  Blood pressure little high here.  No interval medical issues, emergency room visits etc.  Had Covid December 2020 and since then has been vaccinated and booster along with flu shot.  Will be getting  sometime this year when his bride gets here from [facility].\par March 7, 2022.  Patient returns in follow-up.  Does have new mild T wave inversions in V5 and V6.\par March 31, 2022. Patient return for stress echo.  With pushing was able to do the full 9 minutes at which point his heart rate was only 137, blood pressure was 168/84, shortness of breath but no chest pain.  Initial EKG no different than previously with minimal nonsignificant ST changes but then in recovery did become horizontal and then downsloping with T wave inversions and these were more prominent and lasted longer than the previous stress echo.  Echocardiographic wall motion analysis post exercise however clearly showed hyper dynamic LV with no new wall motion changes.  Endocardium not that well visualized however.  No change for now but will probably do a nuclear stress test next time.  Will need a reevaluation within 30 days of bariatric surgery should he be approved and schedule it. \par \par

## 2022-03-31 NOTE — CARDIOLOGY SUMMARY
[___] : [unfilled] [Normal] : normal [___] : [unfilled] [de-identified] : Sinus rhythm with first-degree AV block [de-identified] : 4/28/2021.  Exercised 8 minutes to a heart rate of 139 and a blood pressure of 210/70.  Fatigue and some shortness of breath but no chest pain.  No ST changes.  No VPCs.  No echocardiographic evidence of ischemia. ;  March 31, 2022.  Exercised 9 minutes to a heart rate of 137 and a blood pressure of 168/84.  Fatigue and shortness of breath but no chest pain.  Initially just mild nonspecific ST changes but these became horizontal and even downsloping with T wave inversions in recovery lasting more than 7-1/2 minutes.  Similar but milder changes were noted on the last stress test in April 2021 but not as impressive but did not last as long.  Still no echocardiographic evidence of ischemia (endocardium not that well visualized) [de-identified] : 3/27/2019, Chordal MATHEW. Minimal MR. No LVOT gradient. Calcified, trileaflet aortic valve with normal opening. No AI. Mild LAE. Endocardium not well visualized. LVEF 55%. Concentric remodeling. Diastolic function difficult to determine. No evidence of infiltrative disease. Normal RV size and function. Minimal TR. Normal pericardium with no effusion ; 4/28/2021 calcified anterior mitral leaflet with chordal S.A.M.  Minimal MR.  Calcified trileaflet aortic valve with normal opening.  No AI.  Aortic root 3.7 cm.  Normal left atrium.  Endocardium not that well visualized but grossly normal LV systolic function with LVEF 70%.  No significant LVOT gradient even with Valsalva.  Concentric remodeling versus borderline LVH.  Normal diastolic function.  Normal RV size and function with mild TR.  RVSP 29.  Limited visualization of pericardium. [de-identified] : 3/8/2019, normal, Right dominant circulation with normal left main, LAD, circumflex, right coronary artery.

## 2022-03-31 NOTE — REVIEW OF SYSTEMS
[Dyspnea on exertion] : dyspnea during exertion [Negative] : Heme/Lymph [Weight Gain (___ Lbs)] : [unfilled] ~Ulb weight gain [Weight Loss (___ Lbs)] : no recent weight loss [Chest Discomfort] : no chest discomfort [Lower Ext Edema] : no extremity edema [Leg Claudication] : no intermittent leg claudication [Palpitations] : no palpitations [Orthopnea] : no orthopnea [PND] : no PND [Syncope] : no syncope

## 2022-07-01 ENCOUNTER — APPOINTMENT (OUTPATIENT)
Dept: CARDIOLOGY | Facility: CLINIC | Age: 62
End: 2022-07-01

## 2022-07-01 ENCOUNTER — NON-APPOINTMENT (OUTPATIENT)
Age: 62
End: 2022-07-01

## 2022-07-01 VITALS
BODY MASS INDEX: 35.07 KG/M2 | SYSTOLIC BLOOD PRESSURE: 134 MMHG | OXYGEN SATURATION: 96 % | HEIGHT: 70 IN | HEART RATE: 69 BPM | WEIGHT: 245 LBS | DIASTOLIC BLOOD PRESSURE: 80 MMHG

## 2022-07-01 DIAGNOSIS — R94.31 ABNORMAL ELECTROCARDIOGRAM [ECG] [EKG]: ICD-10-CM

## 2022-07-01 DIAGNOSIS — E78.9 DISORDER OF LIPOPROTEIN METABOLISM, UNSPECIFIED: ICD-10-CM

## 2022-07-01 DIAGNOSIS — Z01.810 ENCOUNTER FOR PREPROCEDURAL CARDIOVASCULAR EXAMINATION: ICD-10-CM

## 2022-07-01 PROCEDURE — 99215 OFFICE O/P EST HI 40 MIN: CPT

## 2022-07-01 PROCEDURE — 36415 COLL VENOUS BLD VENIPUNCTURE: CPT

## 2022-07-01 PROCEDURE — 93000 ELECTROCARDIOGRAM COMPLETE: CPT | Mod: NC

## 2022-07-06 ENCOUNTER — APPOINTMENT (OUTPATIENT)
Dept: CARDIOLOGY | Facility: CLINIC | Age: 62
End: 2022-07-06

## 2022-07-06 LAB
ALBUMIN SERPL ELPH-MCNC: 4.6 G/DL
ALP BLD-CCNC: 82 U/L
ALT SERPL-CCNC: 33 U/L
ANION GAP SERPL CALC-SCNC: 13 MMOL/L
AST SERPL-CCNC: 20 U/L
BILIRUB SERPL-MCNC: 0.8 MG/DL
BUN SERPL-MCNC: 20 MG/DL
CALCIUM SERPL-MCNC: 8.9 MG/DL
CHLORIDE SERPL-SCNC: 103 MMOL/L
CHOLEST SERPL-MCNC: 103 MG/DL
CO2 SERPL-SCNC: 23 MMOL/L
CREAT SERPL-MCNC: 0.93 MG/DL
EGFR: 93 ML/MIN/1.73M2
GLUCOSE SERPL-MCNC: 83 MG/DL
HDLC SERPL-MCNC: 22 MG/DL
LDLC SERPL CALC-MCNC: 48 MG/DL
NONHDLC SERPL-MCNC: 80 MG/DL
NT-PROBNP SERPL-MCNC: 27 PG/ML
POTASSIUM SERPL-SCNC: 4.4 MMOL/L
PROT SERPL-MCNC: 6.8 G/DL
SODIUM SERPL-SCNC: 139 MMOL/L
TRIGL SERPL-MCNC: 161 MG/DL

## 2022-07-06 PROCEDURE — 93015 CV STRESS TEST SUPVJ I&R: CPT

## 2022-07-06 PROCEDURE — A9500: CPT

## 2022-07-06 PROCEDURE — 78452 HT MUSCLE IMAGE SPECT MULT: CPT

## 2022-07-06 NOTE — HISTORY OF PRESENT ILLNESS
[Preoperative Visit] : for a medical evaluation prior to surgery [Scheduled Procedure ___] : a [unfilled] [Date of Surgery ___] : on [unfilled] [Surgeon Name ___] : surgeon: [unfilled] [Good] : Good [Dyspnea] : dyspnea [Pulmonary Disease] : pulmonary disease [Sleep Apnea] : sleep apnea [Prior Anesthesia] : Prior anesthesia [Electrocardiogram] : ~T an ECG ~C was performed [Echocardiogram] : ~T an echocardiogram ~C was performed [Cardiac Catheterization  (Diagnostic)] : cardiac catheterization ~T ~C was performed [Cardiovascular Stress Test] : a cardiac stress test ~T ~C was performed [Sudden Death] : sudden death [Fever] : no fever [Chills] : no chills [Fatigue] : no fatigue [Chest Pain] : no chest pain [Cough] : no cough [Dysuria] : no dysuria [Urinary Frequency] : no urinary frequency [Nausea] : no nausea [Vomiting] : no vomiting [Diarrhea] : no diarrhea [Abdominal Pain] : no abdominal pain [Easy Bruising] : no easy bruising [Lower Extremity Swelling] : no lower extremity swelling [Poor Exercise Tolerance] : no poor exercise tolerance [Diabetes] : no diabetes [Cardiovascular Disease] : no cardiovascular disease [Anti-Platelet Agents] : no anti-platelet agents [Nicotine Dependence] : no nicotine dependence [Alcohol Use] : no  alcohol use [Renal Disease] : no renal disease [GI Disease] : no gastrointestinal disease [Thromboembolic Problems] : no thromboembolic problems [Frequent use of NSAIDs] : no use of NSAIDs [Transfusion Reaction] : no transfusion reaction [Impaired Immunity] : no impaired immunity [Steroid Use in Last 6 Months] : no steroid use in the last six months [Frequent Aspirin Use] : no frequent aspirin use [Prev Anesthesia Reaction] : no previous anesthesia reaction [Anesthesia Reaction] : no anesthesia reaction [Clotting Disorder] : no clotting disorder [Bleeding Disorder] : no bleeding disorder [de-identified] : [facility] [de-identified] : Brother with sudden death at home within 1 week of discharge from hospital after acute MI [FreeTextEntry1] : March 5, 2019. The patient is a 58-year-old, overweight man in both his father and mother have severe coronary artery disease. The patient himself has a low HDL between 16 and 23 for some time. He also has hypertension for the last few years. That has been treated with diltiazem 300 and losartan//25. No diabetes. No cigarette smoking. He tends to delay medical care. He thinks the last few months and maybe as far back as 6. He gets pressure in his chest, which radiates to his left thumb when he is exerting himself alone. Some shortness of breath. This has progressed to where her symptoms are coming with virtually any exertion. The last week or so. No rest symptoms. He recently saw pulmonary for one visit and was told that he probably has severe sleep apnea, but no other diagnosis.\par March 8, 2019. Cardiac catheterization revealed totally normal coronary arteries.\par March 11, 2019. Reviewed results with patient. Stop metoprolol and stop aspirin. Continue atorvastatin because of low HDL. Schedule echocardiogram because of shortness of breath with abnormal ECG. Discussed elevated hemoglobin and hematocrit. Will draw erythropoietin level when he returns for his echo and then consider a hematology referral. \par March 27, 2019. Patient returns for followup and echocardiogram. The echo is somewhat suboptimal, but unremarkable. Maybe some minor features of hypertrophic cardiomyopathy with concentric remodeling, chordal MATHEW, diastolic function difficult to determine, systolic function seems okay. Repeat EKG still remains with T wave abnormalities V5 and V6, as well as first degree AV block and  borderline right axis. Will remain on current medications, followup about his sleep apnea, send erythropoietin level about his possible polycythemia, and consider hematology workup. If necessary perhaps consider cardiac MRI in the future.\par September 23, 2019.  Returns for visit as could not get his prescriptions renewed.  Denies any new symptoms or change.  Seems to be asymptomatic and doing well.  Still overweight but weight coming down slowly.\par October 3, 2019. Reviewed labs with patient. Patient claims he has been off losartan HCT for some time now. We agreed to continue off losartan HCT as long as he returns in 2 months for follow-up exam and if blood pressure excellent will continue to stay off of that medicine. \par March 23, 2020 Patient doing okay but somewhat stressed as his brother who was not overweight had a good diet worked out etc. had a heart attack, did get a stent but then about 3 days after discharge had sudden cardiac death at home.  Patient himself is doing well except he has put back his weight and his blood pressure has gone up some.  No cardiac symptoms.  EKG has mildly inverted T waves in V4 through 6 which have been there on some of his previous EKGs on and off.  No other medical issues.\par His A1c was 6.0.  Cholesterol 144, triglycerides 235, HDL 30, LDL 66.  Other labs within normal limits.\par July 20, 2020.  Patient returns in follow-up.  Remains in sinus rhythm.  Anterior T waves now upright change in V1 could be lead placement.  Sinus rhythm at 60.  Denies chest pain or shortness of breath.  No symptoms consistent with COVID-19 or known exposure.  Did lose 7 pounds.\par April 28, 2021.  Patient returns for follow-up and for stress echocardiogram.  Put back 10 pounds.  Had been going to the bariatric center and is on some medication for weight loss but seems interested in seeing the dietitian here who I referred to him.  Denies chest pain and has shortness of breath probably just from his weight.  His echocardiogram was unchanged with borderline LVH and may be hyperdynamic systolic function but normal diastolic function and no significant valve disease.  He was able to exercise for 8 minutes to a heart rate of 139 and a blood pressure of 210/70.  No chest pain, no ST changes, no echo evidence of ischemia, and no VPCs.  Labs were sent--hemoglobin 16.5 with MCV 50.3.  Normal chemistries including BUN 17 creatinine 1.08.  Cholesterol 105, triglycerides 152, HDL 24, LDL 50.  Globin A1c 6.2.\par January 24, 2022.  First visit since last April.  Here together with his mother.  EKG is sinus rhythm at 86 with an axis of +90 but otherwise unremarkable.  Weight down 9 pounds.  Unfortunately he ran out of diltiazem and atorvastatin at the beginning of December.  Blood pressure little high here.  No interval medical issues, emergency room visits etc.  Had Covid December 2020 and since then has been vaccinated and booster along with flu shot.  Will be getting  sometime this year when his bride gets here from [facility].\par March 7, 2022.  Patient returns in follow-up.  He is in the process of a work-up for bariatric surgery and is sent here for preop cardiac clearance and evaluation.  He did see pulmonary and was told he has mild degree of sleep apnea but has been unable to tolerate the CPAP machine.  He denies chest pain or shortness of breath yet does have new mild T wave inversions in V5 and V6.  His blood pressure was well controlled last time but has been elevated today on multiple readings; he has had a lot of stress and he did not sleep much the last few days.  His wife to be is still in [facility] and it is not clear when she will be coming.\par March 31, 2022.  Patient return for stress echo.  With pushing was able to do the full 9 minutes at which point his heart rate was only 137, blood pressure was 168/84, shortness of breath but no chest pain.  Initial EKG no different than previously with minimal nonsignificant ST changes but then in recovery did become horizontal and then downsloping with T wave inversions and these were more prominent and lasted longer than the previous stress echo.  Echocardiographic wall motion analysis post exercise however clearly showed hyper dynamic LV with no new wall motion changes.  Endocardium not that well visualized however.  No change for now but will probably do a nuclear stress test next time.  Will need a reevaluation within 30 days of bariatric surgery should he be approved and schedule it. \par July 1, 2022.  Patient returns today for his preop evaluation as the bariatric surgery is scheduled in 10 days.  He has been feeling fine, just tired because he works nights.  A lot of running around with his mother who will be 90 soon and she needs another ureteral stent change etc.  No new complaints but when I reviewed the results of his stress echo again with him I told him because of the abnormal ECG response and the suboptimal echo pictures post exercise but he really should have a nuclear stress test prior to the bariatric surgery.  This will be done next week.\par July 6, 2022.  Patient returns for nuclear stress test.  He exercised 10 minutes to a heart rate of 146 and a blood pressure of 190/88.  Shortness of breath but no chest pain.  No significant ST changes.  Rare VPCs.  Normal perfusion with no evidence of ischemia or infarction.  Post exercise LVEF 77%.

## 2022-07-06 NOTE — CARDIOLOGY SUMMARY
[___] : [unfilled] [Normal] : normal [de-identified] : Sinus rhythm with first-degree AV block [de-identified] : 4/28/2021.  Exercised 8 minutes to a heart rate of 139 and a blood pressure of 210/70.  Fatigue and some shortness of breath but no chest pain.  No ST changes.  No VPCs.  No echocardiographic evidence of ischemia.\par March 31, 2022.  Exercised 9 minutes of a [place] protocol to a heart rate of 137 and a blood pressure of 168/84.  Shortness of breath but no chest pain.  EKG initially with just minimal nonsignificant ST changes as he had in the past but then in recovery had horizontal ST depressions then downsloping with T wave inversions more prominent and lasting longer than on his previous stress echo in 2021.  Echocardiographic wall motion analysis post exercise was hyperdynamic but the endocardium was not that well visualized So wall motion abnormalities and ischemia cannot be ruled out.  Consider nuclear stress test\par July 6, 2022.  Exercised 10 minutes to a heart rate of 146 and a blood pressure of 190/88.  Shortness of breath but no chest pain.  No significant ST changes.  Rare VPCs.  Normal perfusion with no evidence of ischemia or infarction.  LVEF post exercise 77%. [de-identified] : 3/27/2019, Chordal MATHEW. Minimal MR. No LVOT gradient. Calcified, trileaflet aortic valve with normal opening. No AI. Mild LAE. Endocardium not well visualized. LVEF 55%. Concentric remodeling. Diastolic function difficult to determine. No evidence of infiltrative disease. Normal RV size and function. Minimal TR. Normal pericardium with no effusion ; 4/28/2021 calcified anterior mitral leaflet with chordal S.A.M.  Minimal MR.  Calcified trileaflet aortic valve with normal opening.  No AI.  Aortic root 3.7 cm.  Normal left atrium.  Endocardium not that well visualized but grossly normal LV systolic function with LVEF 70%.  No significant LVOT gradient even with Valsalva.  Concentric remodeling versus borderline LVH.  Normal diastolic function.  Normal RV size and function with mild TR.  RVSP 29.  Limited visualization of pericardium. [de-identified] : 3/8/2019, normal, Right dominant circulation with normal left main, LAD, circumflex, right coronary artery.

## 2022-07-06 NOTE — PHYSICAL EXAM
[General Appearance - Well Developed] : well developed [Normal Appearance] : normal appearance [Well Groomed] : well groomed [General Appearance - Well Nourished] : well nourished [No Deformities] : no deformities [General Appearance - In No Acute Distress] : no acute distress [Normal Conjunctiva] : the conjunctiva exhibited no abnormalities [Eyelids - No Xanthelasma] : the eyelids demonstrated no xanthelasmas [Normal Oral Mucosa] : normal oral mucosa [No Oral Pallor] : no oral pallor [No Oral Cyanosis] : no oral cyanosis [Normal Jugular Venous A Waves Present] : normal jugular venous A waves present [Normal Jugular Venous V Waves Present] : normal jugular venous V waves present [No Jugular Venous Coleman A Waves] : no jugular venous coleman A waves [Respiration, Rhythm And Depth] : normal respiratory rhythm and effort [Exaggerated Use Of Accessory Muscles For Inspiration] : no accessory muscle use [Auscultation Breath Sounds / Voice Sounds] : lungs were clear to auscultation bilaterally [Heart Sounds] : normal S1 and S2 [Heart Rate And Rhythm] : heart rate and rhythm were normal [Murmurs] : no murmurs present [Abdomen Tenderness] : non-tender [Abdomen Soft] : soft [Abdomen Mass (___ Cm)] : no abdominal mass palpated [Abnormal Walk] : normal gait [Gait - Sufficient For Exercise Testing] : the gait was sufficient for exercise testing [Nail Clubbing] : no clubbing of the fingernails [Cyanosis, Localized] : no localized cyanosis [Petechial Hemorrhages (___cm)] : no petechial hemorrhages [Skin Color & Pigmentation] : normal skin color and pigmentation [] : no rash [No Venous Stasis] : no venous stasis [Skin Lesions] : no skin lesions [No Skin Ulcers] : no skin ulcer [No Xanthoma] : no  xanthoma was observed [Oriented To Time, Place, And Person] : oriented to person, place, and time [Mood] : the mood was normal [Affect] : the affect was normal [No Anxiety] : not feeling anxious [FreeTextEntry1] : No edema. Pulses 2+ bilaterally, symmetric, including pedal

## 2022-07-06 NOTE — CARDIOLOGY SUMMARY
[___] : [unfilled] [Normal] : normal [de-identified] : Sinus rhythm with first-degree AV block [de-identified] : 4/28/2021.  Exercised 8 minutes to a heart rate of 139 and a blood pressure of 210/70.  Fatigue and some shortness of breath but no chest pain.  No ST changes.  No VPCs.  No echocardiographic evidence of ischemia.\par March 31, 2022.  Exercised 9 minutes of a [place] protocol to a heart rate of 137 and a blood pressure of 168/84.  Shortness of breath but no chest pain.  EKG initially with just minimal nonsignificant ST changes as he had in the past but then in recovery had horizontal ST depressions then downsloping with T wave inversions more prominent and lasting longer than on his previous stress echo in 2021.  Echocardiographic wall motion analysis post exercise was hyperdynamic but the endocardium was not that well visualized So wall motion abnormalities and ischemia cannot be ruled out.  Consider nuclear stress test\par July 6, 2022.  Exercised 10 minutes to a heart rate of 146 and a blood pressure of 190/88.  Shortness of breath but no chest pain.  No significant ST changes.  Rare VPCs.  Normal perfusion with no evidence of ischemia or infarction.  LVEF post exercise 77%. [de-identified] : 3/27/2019, Chordal MATHEW. Minimal MR. No LVOT gradient. Calcified, trileaflet aortic valve with normal opening. No AI. Mild LAE. Endocardium not well visualized. LVEF 55%. Concentric remodeling. Diastolic function difficult to determine. No evidence of infiltrative disease. Normal RV size and function. Minimal TR. Normal pericardium with no effusion ; 4/28/2021 calcified anterior mitral leaflet with chordal S.A.M.  Minimal MR.  Calcified trileaflet aortic valve with normal opening.  No AI.  Aortic root 3.7 cm.  Normal left atrium.  Endocardium not that well visualized but grossly normal LV systolic function with LVEF 70%.  No significant LVOT gradient even with Valsalva.  Concentric remodeling versus borderline LVH.  Normal diastolic function.  Normal RV size and function with mild TR.  RVSP 29.  Limited visualization of pericardium. [de-identified] : 3/8/2019, normal, Right dominant circulation with normal left main, LAD, circumflex, right coronary artery.

## 2022-07-06 NOTE — PHYSICAL EXAM
[General Appearance - Well Developed] : well developed [Normal Appearance] : normal appearance [Well Groomed] : well groomed [General Appearance - Well Nourished] : well nourished [No Deformities] : no deformities [General Appearance - In No Acute Distress] : no acute distress [Normal Conjunctiva] : the conjunctiva exhibited no abnormalities [Eyelids - No Xanthelasma] : the eyelids demonstrated no xanthelasmas [Normal Oral Mucosa] : normal oral mucosa [No Oral Pallor] : no oral pallor [No Oral Cyanosis] : no oral cyanosis [Normal Jugular Venous A Waves Present] : normal jugular venous A waves present [Normal Jugular Venous V Waves Present] : normal jugular venous V waves present [No Jugular Venous Coleman A Waves] : no jugular venous coleman A waves [Respiration, Rhythm And Depth] : normal respiratory rhythm and effort [Exaggerated Use Of Accessory Muscles For Inspiration] : no accessory muscle use [Auscultation Breath Sounds / Voice Sounds] : lungs were clear to auscultation bilaterally [Heart Sounds] : normal S1 and S2 [Heart Rate And Rhythm] : heart rate and rhythm were normal [Murmurs] : no murmurs present [Abdomen Soft] : soft [Abdomen Tenderness] : non-tender [Abdomen Mass (___ Cm)] : no abdominal mass palpated [Abnormal Walk] : normal gait [Gait - Sufficient For Exercise Testing] : the gait was sufficient for exercise testing [Nail Clubbing] : no clubbing of the fingernails [Cyanosis, Localized] : no localized cyanosis [Petechial Hemorrhages (___cm)] : no petechial hemorrhages [Skin Color & Pigmentation] : normal skin color and pigmentation [] : no rash [No Venous Stasis] : no venous stasis [Skin Lesions] : no skin lesions [No Skin Ulcers] : no skin ulcer [No Xanthoma] : no  xanthoma was observed [Oriented To Time, Place, And Person] : oriented to person, place, and time [Mood] : the mood was normal [Affect] : the affect was normal [No Anxiety] : not feeling anxious [FreeTextEntry1] : No edema. Pulses 2+ bilaterally, symmetric, including pedal

## 2022-07-06 NOTE — REVIEW OF SYSTEMS
[Dyspnea on exertion] : dyspnea during exertion [Negative] : Heme/Lymph [Weight Loss (___ Lbs)] : no recent weight loss [Weight Gain (___ Lbs)] : no recent weight gain [Chest Discomfort] : no chest discomfort [Lower Ext Edema] : no extremity edema [Leg Claudication] : no intermittent leg claudication [Palpitations] : no palpitations [Orthopnea] : no orthopnea [PND] : no PND [Syncope] : no syncope

## 2022-07-06 NOTE — DISCUSSION/SUMMARY
[Procedure Intermediate Risk] : the procedure risk is intermediate [Additional Diagnostics Recommended] : additional diagnostics recommended [Optimized for Surgery] : the patient is optimized for surgery [FreeTextEntry1] : 61-year-old man with family history of coronary disease, risk factors as well, history of abnormal ECG yet in 2019 with those had totally normal coronary arteries.  Has a component of sleep apnea that is mild and he does not tolerate CPAP.  Blood pressure had been well controlled but is a little elevated today.  EKG mildly abnormal compared with the normal one in January of this year.  Stress echocardiogram on March 31 equivocal for ischemia so a nuclear stress test was done on July 6 which showed no ischemia and normal LVEF.  The patient should not be considered to be at increased risk for the surgery and anesthesia other than related to his weight.

## 2022-07-06 NOTE — HISTORY OF PRESENT ILLNESS
[Preoperative Visit] : for a medical evaluation prior to surgery [Scheduled Procedure ___] : a [unfilled] [Date of Surgery ___] : on [unfilled] [Surgeon Name ___] : surgeon: [unfilled] [Good] : Good [Dyspnea] : dyspnea [Pulmonary Disease] : pulmonary disease [Sleep Apnea] : sleep apnea [Prior Anesthesia] : Prior anesthesia [Electrocardiogram] : ~T an ECG ~C was performed [Echocardiogram] : ~T an echocardiogram ~C was performed [Cardiac Catheterization  (Diagnostic)] : cardiac catheterization ~T ~C was performed [Cardiovascular Stress Test] : a cardiac stress test ~T ~C was performed [Sudden Death] : sudden death [Fever] : no fever [Chills] : no chills [Fatigue] : no fatigue [Chest Pain] : no chest pain [Cough] : no cough [Dysuria] : no dysuria [Urinary Frequency] : no urinary frequency [Nausea] : no nausea [Vomiting] : no vomiting [Diarrhea] : no diarrhea [Abdominal Pain] : no abdominal pain [Easy Bruising] : no easy bruising [Lower Extremity Swelling] : no lower extremity swelling [Poor Exercise Tolerance] : no poor exercise tolerance [Diabetes] : no diabetes [Cardiovascular Disease] : no cardiovascular disease [Anti-Platelet Agents] : no anti-platelet agents [Nicotine Dependence] : no nicotine dependence [Alcohol Use] : no  alcohol use [Renal Disease] : no renal disease [GI Disease] : no gastrointestinal disease [Thromboembolic Problems] : no thromboembolic problems [Frequent use of NSAIDs] : no use of NSAIDs [Transfusion Reaction] : no transfusion reaction [Impaired Immunity] : no impaired immunity [Steroid Use in Last 6 Months] : no steroid use in the last six months [Frequent Aspirin Use] : no frequent aspirin use [Prev Anesthesia Reaction] : no previous anesthesia reaction [Anesthesia Reaction] : no anesthesia reaction [Clotting Disorder] : no clotting disorder [Bleeding Disorder] : no bleeding disorder [de-identified] : [facility] [de-identified] : Brother with sudden death at home within 1 week of discharge from hospital after acute MI [FreeTextEntry1] : March 5, 2019. The patient is a 58-year-old, overweight man in both his father and mother have severe coronary artery disease. The patient himself has a low HDL between 16 and 23 for some time. He also has hypertension for the last few years. That has been treated with diltiazem 300 and losartan//25. No diabetes. No cigarette smoking. He tends to delay medical care. He thinks the last few months and maybe as far back as 6. He gets pressure in his chest, which radiates to his left thumb when he is exerting himself alone. Some shortness of breath. This has progressed to where her symptoms are coming with virtually any exertion. The last week or so. No rest symptoms. He recently saw pulmonary for one visit and was told that he probably has severe sleep apnea, but no other diagnosis.\par March 8, 2019. Cardiac catheterization revealed totally normal coronary arteries.\par March 11, 2019. Reviewed results with patient. Stop metoprolol and stop aspirin. Continue atorvastatin because of low HDL. Schedule echocardiogram because of shortness of breath with abnormal ECG. Discussed elevated hemoglobin and hematocrit. Will draw erythropoietin level when he returns for his echo and then consider a hematology referral. \par March 27, 2019. Patient returns for followup and echocardiogram. The echo is somewhat suboptimal, but unremarkable. Maybe some minor features of hypertrophic cardiomyopathy with concentric remodeling, chordal MATHEW, diastolic function difficult to determine, systolic function seems okay. Repeat EKG still remains with T wave abnormalities V5 and V6, as well as first degree AV block and  borderline right axis. Will remain on current medications, followup about his sleep apnea, send erythropoietin level about his possible polycythemia, and consider hematology workup. If necessary perhaps consider cardiac MRI in the future.\par September 23, 2019.  Returns for visit as could not get his prescriptions renewed.  Denies any new symptoms or change.  Seems to be asymptomatic and doing well.  Still overweight but weight coming down slowly.\par October 3, 2019. Reviewed labs with patient. Patient claims he has been off losartan HCT for some time now. We agreed to continue off losartan HCT as long as he returns in 2 months for follow-up exam and if blood pressure excellent will continue to stay off of that medicine. \par March 23, 2020 Patient doing okay but somewhat stressed as his brother who was not overweight had a good diet worked out etc. had a heart attack, did get a stent but then about 3 days after discharge had sudden cardiac death at home.  Patient himself is doing well except he has put back his weight and his blood pressure has gone up some.  No cardiac symptoms.  EKG has mildly inverted T waves in V4 through 6 which have been there on some of his previous EKGs on and off.  No other medical issues.\par His A1c was 6.0.  Cholesterol 144, triglycerides 235, HDL 30, LDL 66.  Other labs within normal limits.\par July 20, 2020.  Patient returns in follow-up.  Remains in sinus rhythm.  Anterior T waves now upright change in V1 could be lead placement.  Sinus rhythm at 60.  Denies chest pain or shortness of breath.  No symptoms consistent with COVID-19 or known exposure.  Did lose 7 pounds.\par April 28, 2021.  Patient returns for follow-up and for stress echocardiogram.  Put back 10 pounds.  Had been going to the bariatric center and is on some medication for weight loss but seems interested in seeing the dietitian here who I referred to him.  Denies chest pain and has shortness of breath probably just from his weight.  His echocardiogram was unchanged with borderline LVH and may be hyperdynamic systolic function but normal diastolic function and no significant valve disease.  He was able to exercise for 8 minutes to a heart rate of 139 and a blood pressure of 210/70.  No chest pain, no ST changes, no echo evidence of ischemia, and no VPCs.  Labs were sent--hemoglobin 16.5 with MCV 50.3.  Normal chemistries including BUN 17 creatinine 1.08.  Cholesterol 105, triglycerides 152, HDL 24, LDL 50.  Globin A1c 6.2.\par January 24, 2022.  First visit since last April.  Here together with his mother.  EKG is sinus rhythm at 86 with an axis of +90 but otherwise unremarkable.  Weight down 9 pounds.  Unfortunately he ran out of diltiazem and atorvastatin at the beginning of December.  Blood pressure little high here.  No interval medical issues, emergency room visits etc.  Had Covid December 2020 and since then has been vaccinated and booster along with flu shot.  Will be getting  sometime this year when his bride gets here from [facility].\par March 7, 2022.  Patient returns in follow-up.  He is in the process of a work-up for bariatric surgery and is sent here for preop cardiac clearance and evaluation.  He did see pulmonary and was told he has mild degree of sleep apnea but has been unable to tolerate the CPAP machine.  He denies chest pain or shortness of breath yet does have new mild T wave inversions in V5 and V6.  His blood pressure was well controlled last time but has been elevated today on multiple readings; he has had a lot of stress and he did not sleep much the last few days.  His wife to be is still in [facility] and it is not clear when she will be coming.\par March 31, 2022.  Patient return for stress echo.  With pushing was able to do the full 9 minutes at which point his heart rate was only 137, blood pressure was 168/84, shortness of breath but no chest pain.  Initial EKG no different than previously with minimal nonsignificant ST changes but then in recovery did become horizontal and then downsloping with T wave inversions and these were more prominent and lasted longer than the previous stress echo.  Echocardiographic wall motion analysis post exercise however clearly showed hyper dynamic LV with no new wall motion changes.  Endocardium not that well visualized however.  No change for now but will probably do a nuclear stress test next time.  Will need a reevaluation within 30 days of bariatric surgery should he be approved and schedule it. \par July 1, 2022.  Patient returns today for his preop evaluation as the bariatric surgery is scheduled in 10 days.  He has been feeling fine, just tired because he works nights.  A lot of running around with his mother who will be 90 soon and she needs another ureteral stent change etc.  No new complaints but when I reviewed the results of his stress echo again with him I told him because of the abnormal ECG response and the suboptimal echo pictures post exercise but he really should have a nuclear stress test prior to the bariatric surgery.  This will be done next week.\par July 6, 2022.  Patient returns for nuclear stress test.  He exercised 10 minutes to a heart rate of 146 and a blood pressure of 190/88.  Shortness of breath but no chest pain.  No significant ST changes.  Rare VPCs.  Normal perfusion with no evidence of ischemia or infarction.  Post exercise LVEF 77%.

## 2022-07-25 ENCOUNTER — RX RENEWAL (OUTPATIENT)
Age: 62
End: 2022-07-25

## 2022-09-22 ENCOUNTER — APPOINTMENT (OUTPATIENT)
Dept: CARDIOLOGY | Facility: CLINIC | Age: 62
End: 2022-09-22

## 2022-09-22 ENCOUNTER — NON-APPOINTMENT (OUTPATIENT)
Age: 62
End: 2022-09-22

## 2022-09-22 VITALS
SYSTOLIC BLOOD PRESSURE: 131 MMHG | DIASTOLIC BLOOD PRESSURE: 78 MMHG | WEIGHT: 204 LBS | BODY MASS INDEX: 29.27 KG/M2 | HEART RATE: 65 BPM | OXYGEN SATURATION: 97 %

## 2022-09-22 DIAGNOSIS — E83.51 HYPOCALCEMIA: ICD-10-CM

## 2022-09-22 DIAGNOSIS — I10 ESSENTIAL (PRIMARY) HYPERTENSION: ICD-10-CM

## 2022-09-22 DIAGNOSIS — Z90.3 ACQUIRED ABSENCE OF STOMACH [PART OF]: ICD-10-CM

## 2022-09-22 DIAGNOSIS — R06.02 SHORTNESS OF BREATH: ICD-10-CM

## 2022-09-22 PROCEDURE — 99215 OFFICE O/P EST HI 40 MIN: CPT | Mod: 25

## 2022-09-22 PROCEDURE — 93000 ELECTROCARDIOGRAM COMPLETE: CPT

## 2022-09-22 PROCEDURE — 36415 COLL VENOUS BLD VENIPUNCTURE: CPT

## 2022-09-22 NOTE — REVIEW OF SYSTEMS
[Negative] : Heme/Lymph [Weight Gain (___ Lbs)] : no recent weight gain [Weight Loss (___ Lbs)] : [unfilled] ~Ulb weight loss [Dyspnea on exertion] : not dyspnea during exertion [Chest Discomfort] : no chest discomfort [Lower Ext Edema] : no extremity edema [Leg Claudication] : no intermittent leg claudication [Palpitations] : no palpitations [Orthopnea] : no orthopnea [PND] : no PND [Syncope] : no syncope

## 2022-09-22 NOTE — CARDIOLOGY SUMMARY
[___] : [unfilled] [Normal] : normal [de-identified] : Sinus rhythm with first-degree AV block [de-identified] : 4/28/2021.  Exercised 8 minutes to a heart rate of 139 and a blood pressure of 210/70.  Fatigue and some shortness of breath but no chest pain.  No ST changes.  No VPCs.  No echocardiographic evidence of ischemia.\par March 31, 2022.  Exercised 9 minutes of a [place] protocol to a heart rate of 137 and a blood pressure of 168/84.  Shortness of breath but no chest pain.  EKG initially with just minimal nonsignificant ST changes as he had in the past but then in recovery had horizontal ST depressions then downsloping with T wave inversions more prominent and lasting longer than on his previous stress echo in 2021.  Echocardiographic wall motion analysis post exercise was hyperdynamic but the endocardium was not that well visualized So wall motion abnormalities and ischemia cannot be ruled out.  Consider nuclear stress test\par July 6, 2022.  Exercised 10 minutes to a heart rate of 146 and a blood pressure of 190/88.  Shortness of breath but no chest pain.  No significant ST changes.  Rare VPCs.  Normal perfusion with no evidence of ischemia or infarction.  LVEF post exercise 77%. [de-identified] : 3/27/2019, Chordal MATHEW. Minimal MR. No LVOT gradient. Calcified, trileaflet aortic valve with normal opening. No AI. Mild LAE. Endocardium not well visualized. LVEF 55%. Concentric remodeling. Diastolic function difficult to determine. No evidence of infiltrative disease. Normal RV size and function. Minimal TR. Normal pericardium with no effusion ; 4/28/2021 calcified anterior mitral leaflet with chordal S.A.M.  Minimal MR.  Calcified trileaflet aortic valve with normal opening.  No AI.  Aortic root 3.7 cm.  Normal left atrium.  Endocardium not that well visualized but grossly normal LV systolic function with LVEF 70%.  No significant LVOT gradient even with Valsalva.  Concentric remodeling versus borderline LVH.  Normal diastolic function.  Normal RV size and function with mild TR.  RVSP 29.  Limited visualization of pericardium. [de-identified] : 3/8/2019, normal, Right dominant circulation with normal left main, LAD, circumflex, right coronary artery.

## 2022-09-22 NOTE — REASON FOR VISIT
[FreeTextEntry1] : 61-year-old man with strong family history of coronary artery disease with exertional chest pressure and shortness of breath for 6 months, normal coronaries on cath..\par Now status post bariatric surgery on July 12, 2022.

## 2022-09-22 NOTE — HISTORY OF PRESENT ILLNESS
[FreeTextEntry1] : March 5, 2019. The patient is a 58-year-old, overweight man in both his father and mother have severe coronary artery disease. The patient himself has a low HDL between 16 and 23 for some time. He also has hypertension for the last few years. That has been treated with diltiazem 300 and losartan//25. No diabetes. No cigarette smoking. He tends to delay medical care. He thinks the last few months and maybe as far back as 6. He gets pressure in his chest, which radiates to his left thumb when he is exerting himself alone. Some shortness of breath. This has progressed to where her symptoms are coming with virtually any exertion. The last week or so. No rest symptoms. He recently saw pulmonary for one visit and was told that he probably has severe sleep apnea, but no other diagnosis.\par March 8, 2019. Cardiac catheterization revealed totally normal coronary arteries.\par March 11, 2019. Reviewed results with patient. Stop metoprolol and stop aspirin. Continue atorvastatin because of low HDL. Schedule echocardiogram because of shortness of breath with abnormal ECG. Discussed elevated hemoglobin and hematocrit. Will draw erythropoietin level when he returns for his echo and then consider a hematology referral. \par March 27, 2019. Patient returns for followup and echocardiogram. The echo is somewhat suboptimal, but unremarkable. Maybe some minor features of hypertrophic cardiomyopathy with concentric remodeling, chordal MATHEW, diastolic function difficult to determine, systolic function seems okay. Repeat EKG still remains with T wave abnormalities V5 and V6, as well as first degree AV block and  borderline right axis. Will remain on current medications, followup about his sleep apnea, send erythropoietin level about his possible polycythemia, and consider hematology workup. If necessary perhaps consider cardiac MRI in the future.\par September 23, 2019.  Returns for visit as could not get his prescriptions renewed.  Denies any new symptoms or change.  Seems to be asymptomatic and doing well.  Still overweight but weight coming down slowly.\par October 3, 2019. Reviewed labs with patient. Patient claims he has been off losartan HCT for some time now. We agreed to continue off losartan HCT as long as he returns in 2 months for follow-up exam and if blood pressure excellent will continue to stay off of that medicine. \par March 23, 2020 Patient doing okay but somewhat stressed as his brother who was not overweight had a good diet worked out etc. had a heart attack, did get a stent but then about 3 days after discharge had sudden cardiac death at home.  Patient himself is doing well except he has put back his weight and his blood pressure has gone up some.  No cardiac symptoms.  EKG has mildly inverted T waves in V4 through 6 which have been there on some of his previous EKGs on and off.  No other medical issues.\par His A1c was 6.0.  Cholesterol 144, triglycerides 235, HDL 30, LDL 66.  Other labs within normal limits.\par July 20, 2020.  Patient returns in follow-up.  Remains in sinus rhythm.  Anterior T waves now upright change in V1 could be lead placement.  Sinus rhythm at 60.  Denies chest pain or shortness of breath.  No symptoms consistent with COVID-19 or known exposure.  Did lose 7 pounds.\par April 28, 2021.  Patient returns for follow-up and for stress echocardiogram.  Put back 10 pounds.  Had been going to the bariatric center and is on some medication for weight loss but seems interested in seeing the dietitian here who I referred to him.  Denies chest pain and has shortness of breath probably just from his weight.  His echocardiogram was unchanged with borderline LVH and may be hyperdynamic systolic function but normal diastolic function and no significant valve disease.  He was able to exercise for 8 minutes to a heart rate of 139 and a blood pressure of 210/70.  No chest pain, no ST changes, no echo evidence of ischemia, and no VPCs.  Labs were sent--hemoglobin 16.5 with MCV 50.3.  Normal chemistries including BUN 17 creatinine 1.08.  Cholesterol 105, triglycerides 152, HDL 24, LDL 50.  Globin A1c 6.2.\par January 24, 2022.  First visit since last April.  Here together with his mother.  EKG is sinus rhythm at 86 with an axis of +90 but otherwise unremarkable.  Weight down 9 pounds.  Unfortunately he ran out of diltiazem and atorvastatin at the beginning of December.  Blood pressure little high here.  No interval medical issues, emergency room visits etc.  Had Covid December 2020 and since then has been vaccinated and booster along with flu shot.  Will be getting  sometime this year when his bride gets here from [facility].\par March 7, 2022.  Patient returns in follow-up.  He is in the process of a work-up for bariatric surgery and is sent here for preop cardiac clearance and evaluation.  He did see pulmonary and was told he has mild degree of sleep apnea but has been unable to tolerate the CPAP machine.  He denies chest pain or shortness of breath yet does have new mild T wave inversions in V5 and V6.  His blood pressure was well controlled last time but has been elevated today on multiple readings; he has had a lot of stress and he did not sleep much the last few days.  His wife to be is still in [facility] and it is not clear when she will be coming.\par March 31, 2022.  Patient return for stress echo.  With pushing was able to do the full 9 minutes at which point his heart rate was only 137, blood pressure was 168/84, shortness of breath but no chest pain.  Initial EKG no different than previously with minimal nonsignificant ST changes but then in recovery did become horizontal and then downsloping with T wave inversions and these were more prominent and lasted longer than the previous stress echo.  Echocardiographic wall motion analysis post exercise however clearly showed hyper dynamic LV with no new wall motion changes.  Endocardium not that well visualized however.  No change for now but will probably do a nuclear stress test next time.  Will need a reevaluation within 30 days of bariatric surgery should he be approved and schedule it. \par July 1, 2022.  Patient returns today for his preop evaluation as the bariatric surgery is scheduled in 10 days.  He has been feeling fine, just tired because he works nights.  A lot of running around with his mother who will be 90 soon and she needs another ureteral stent change etc.  No new complaints but when I reviewed the results of his stress echo again with him I told him because of the abnormal ECG response and the suboptimal echo pictures post exercise but he really should have a nuclear stress test prior to the bariatric surgery.  This will be done next week.\par July 6, 2022.  Patient returns for nuclear stress test.  He exercised 10 minutes to a heart rate of 146 and a blood pressure of 190/88.  Shortness of breath but no chest pain.  No significant ST changes.  Rare VPCs.  Normal perfusion with no evidence of ischemia or infarction.  Post exercise LVEF 77%.\par September 22, 2022.  Patient returns in follow-up after having surgery on July 12.  First day was pretty rough but other than that he has been doing well.  Lost over 40 pounds.  He is wondering if he can come off blood pressure medications.  No other complaints.  No interval COVID issues.

## 2022-09-23 LAB
ALBUMIN SERPL ELPH-MCNC: 4.3 G/DL
ALP BLD-CCNC: 81 U/L
ALT SERPL-CCNC: 16 U/L
ANION GAP SERPL CALC-SCNC: 13 MMOL/L
AST SERPL-CCNC: 11 U/L
BASOPHILS # BLD AUTO: 0.05 K/UL
BASOPHILS NFR BLD AUTO: 0.7 %
BILIRUB SERPL-MCNC: 0.4 MG/DL
BUN SERPL-MCNC: 22 MG/DL
CALCIUM SERPL-MCNC: 9 MG/DL
CHLORIDE SERPL-SCNC: 105 MMOL/L
CHOLEST SERPL-MCNC: 99 MG/DL
CO2 SERPL-SCNC: 24 MMOL/L
CREAT SERPL-MCNC: 0.91 MG/DL
EGFR: 96 ML/MIN/1.73M2
EOSINOPHIL # BLD AUTO: 0.21 K/UL
EOSINOPHIL NFR BLD AUTO: 3.1 %
ESTIMATED AVERAGE GLUCOSE: 117 MG/DL
GLUCOSE SERPL-MCNC: 83 MG/DL
HBA1C MFR BLD HPLC: 5.7 %
HCT VFR BLD CALC: 44.2 %
HDLC SERPL-MCNC: 27 MG/DL
HGB BLD-MCNC: 14.9 G/DL
IMM GRANULOCYTES NFR BLD AUTO: 0.1 %
LDLC SERPL CALC-MCNC: 54 MG/DL
LYMPHOCYTES # BLD AUTO: 2.38 K/UL
LYMPHOCYTES NFR BLD AUTO: 34.6 %
MAN DIFF?: NORMAL
MCHC RBC-ENTMCNC: 29.7 PG
MCHC RBC-ENTMCNC: 33.7 GM/DL
MCV RBC AUTO: 88 FL
MONOCYTES # BLD AUTO: 0.55 K/UL
MONOCYTES NFR BLD AUTO: 8 %
NEUTROPHILS # BLD AUTO: 3.68 K/UL
NEUTROPHILS NFR BLD AUTO: 53.5 %
NONHDLC SERPL-MCNC: 73 MG/DL
PLATELET # BLD AUTO: 185 K/UL
POTASSIUM SERPL-SCNC: 4.2 MMOL/L
PROT SERPL-MCNC: 6.2 G/DL
RBC # BLD: 5.02 M/UL
RBC # FLD: 16.2 %
SODIUM SERPL-SCNC: 143 MMOL/L
TRIGL SERPL-MCNC: 96 MG/DL
WBC # FLD AUTO: 6.88 K/UL

## 2022-09-23 RX ORDER — DILTIAZEM HYDROCHLORIDE 300 MG/1
300 CAPSULE, EXTENDED RELEASE ORAL DAILY
Qty: 90 | Refills: 1 | Status: ACTIVE | COMMUNITY
Start: 2019-03-05 | End: 1900-01-01

## 2022-11-06 ENCOUNTER — RX RENEWAL (OUTPATIENT)
Age: 62
End: 2022-11-06

## 2023-02-07 ENCOUNTER — APPOINTMENT (OUTPATIENT)
Dept: ORTHOPEDIC SURGERY | Facility: CLINIC | Age: 63
End: 2023-02-07

## 2023-02-13 ENCOUNTER — RX RENEWAL (OUTPATIENT)
Age: 63
End: 2023-02-13

## 2023-02-13 RX ORDER — DILTIAZEM HYDROCHLORIDE 300 MG/1
300 CAPSULE, EXTENDED RELEASE ORAL
Qty: 90 | Refills: 0 | Status: ACTIVE | COMMUNITY
Start: 2022-07-25 | End: 1900-01-01

## 2023-04-05 ENCOUNTER — RX RENEWAL (OUTPATIENT)
Age: 63
End: 2023-04-05

## 2023-04-12 ENCOUNTER — APPOINTMENT (OUTPATIENT)
Dept: ORTHOPEDIC SURGERY | Facility: CLINIC | Age: 63
End: 2023-04-12

## 2023-04-25 ENCOUNTER — RX RENEWAL (OUTPATIENT)
Age: 63
End: 2023-04-25

## 2023-04-25 RX ORDER — ATORVASTATIN CALCIUM 80 MG/1
80 TABLET, FILM COATED ORAL
Qty: 90 | Refills: 0 | Status: ACTIVE | COMMUNITY
Start: 2019-03-05 | End: 1900-01-01

## 2023-04-27 ENCOUNTER — APPOINTMENT (OUTPATIENT)
Dept: ORTHOPEDIC SURGERY | Facility: CLINIC | Age: 63
End: 2023-04-27
Payer: COMMERCIAL

## 2023-04-27 VITALS — WEIGHT: 204 LBS | HEIGHT: 70 IN | BODY MASS INDEX: 29.2 KG/M2

## 2023-04-27 DIAGNOSIS — G57.61 LESION OF PLANTAR NERVE, RIGHT LOWER LIMB: ICD-10-CM

## 2023-04-27 PROCEDURE — 73630 X-RAY EXAM OF FOOT: CPT | Mod: RT

## 2023-04-27 PROCEDURE — 99204 OFFICE O/P NEW MOD 45 MIN: CPT | Mod: 25

## 2023-04-27 RX ORDER — MELOXICAM 15 MG/1
15 TABLET ORAL
Qty: 30 | Refills: 1 | Status: ACTIVE | COMMUNITY
Start: 2023-04-27 | End: 1900-01-01

## 2023-04-27 NOTE — ASSESSMENT
[FreeTextEntry1] : wbat\par arch supports\par pain manageable currently\par mobic\par activity as peggy\par f/up closer to winter when he feels sx are worse and will reeval at that time\par The patient's current medication management of their orthopedic diagnosis was reviewed today:\par \par (1) We discussed a comprehensive treatment plan that included possible pharmaceutical management involving the use of prescription strength medications including but not limited to options such as oral Naprosyn 500mg BID, once daily Meloxicam 15 mg, or 500-650 mg Tylenol versus over the counter oral medications and topical prescription NSAID Pennsaid vs over the counter Voltaren gel.\par (2) There is a moderate risk of morbidity with further treatment, especially from use of prescription strength medications and possible side effects of these medications which include upset stomach with oral medications, skin reactions to topical medications and cardiac/renal issues with long term use.\par (3) I recommended that the patient follow-up with their medical physician to discuss any significant specific potential issues with long term medication use such as interactions with current medications or with exacerbation of underlying medical comorbidities.\par (4) The benefits and risks associated with use of injectable, oral or topical, prescription and over the counter anti-inflammatory medications were discussed with the patient. The patient voiced understanding of the risks including but not limited to bleeding, stroke, kidney dysfunction, heart disease, and were referred to the black box warning label for further information.\par \par

## 2023-04-27 NOTE — DATA REVIEWED
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

## 2023-04-27 NOTE — HISTORY OF PRESENT ILLNESS
[6] : 6 [5] : 5 [Dull/Aching] : dull/aching [de-identified] : 04/27/2023: long standing dorsal foot pain. no injury. had csi x2 w/o relief. last injection 6 wks ago. no prior foot surgery. denies dm/tob. . pain worse in winter -- manageable currently [] : Post Surgical Visit: no [FreeTextEntry1] : RT foot

## 2023-04-27 NOTE — PHYSICAL EXAM
[Right] : right foot and ankle [Mild] : mild swelling of dorsal foot [2nd] : 2nd [3rd] : 3rd [NL (40)] : plantar flexion 40 degrees [5___] : eversion 5[unfilled]/5 [2+] : dorsalis pedis pulse: 2+ [] : patient ambulates without assistive device

## 2023-07-05 ENCOUNTER — RX RENEWAL (OUTPATIENT)
Age: 63
End: 2023-07-05

## 2023-07-18 ENCOUNTER — APPOINTMENT (OUTPATIENT)
Dept: ORTHOPEDIC SURGERY | Facility: CLINIC | Age: 63
End: 2023-07-18
Payer: COMMERCIAL

## 2023-07-18 DIAGNOSIS — M54.16 RADICULOPATHY, LUMBAR REGION: ICD-10-CM

## 2023-07-18 PROCEDURE — 99214 OFFICE O/P EST MOD 30 MIN: CPT

## 2023-07-18 NOTE — DATA REVIEWED
[MRI] : MRI [Right] : of the right [Foot] : foot [I independently reviewed and interpreted images and report] : I independently reviewed and interpreted images and report [I reviewed the films/CD and agree] : I reviewed the films/CD and agree [FreeTextEntry1] : 2/3/23: Bone marrow edema in the base of the second metatarsal with associated\par subchondral cyst formation at the level of the second tarsometatarsal joint and\par degenerative changes of the second tarsometatarsal joint. No evidence for\par fractures.\par \par Intermetatarsal neuroma in the third webspace.

## 2023-07-18 NOTE — HISTORY OF PRESENT ILLNESS
[4] : 4 [de-identified] : L ankle fell from bldg 11/09. Increasing mechanical sx w/ activity\par \par 10/23/12 f/u L ankle pain after 10 minutes\par 1/21/13 DAILY PAIN\par 3/19/13: s/p L ankle arthroscopy with chondral picking (3/11/13), doing well, NWB, dressing intact.\par 4/9/13 f/u L ankle has been wb, awaiting auth for visco\par 1/9/14 f/u L ankle Incr pain.\par 5/10/16 f/u L ankle Increasing pain. Takes Motrin. Good response to visco injections\par 6/7/16 f/u L ankle pain persists. Good response to visco in the past.\par 6/16/16 Supartz #2 L ankle\par 6/23/16: Supartz # 3 L ankle\par 6/28/16: Supartz # 4 L ankle\par 7/7/15: Supartz # 5 L ankle. Good response to injections.\par 3/28/17: f/u L ankle, continued pain to the ankle, requesting injection. Good response to supartz.\par \par 7/18/17: L ankle orthovisc sample # 1\par 7/26/17: L ankle orthovisc sample #2\par 8/3/17: L ankle orthovisc # 3\par 8/8/17: L ankle orthovisc # 4. Improved pain.\par \par \par 7/18/23: ROSEMARIE MARTINEZ a 62 year old male here for evaluation of his right foot. Pain to the ball of the right foot that started in the winter of 2022. Pain and burning into the 1-3 toes..  Pain is worse when the temperature drops. He has been taking tylenol with mild relief. He saw Dr Lovell on 4/27/23.  He had a CSI x 2 with podiatry a few months ago with worsening pain. An MRI was ordered. He is taking meloxicam. R Ankle surgery done 20 years ago with Dr. Ochoa.  [FreeTextEntry1] : Right foot

## 2023-07-18 NOTE — PHYSICAL EXAM
[Right] : right foot and ankle [1st] : 1st [2nd] : 2nd [3rd] : 3rd [5___] : plantar flexion 5[unfilled]/5 [2+] : dorsalis pedis pulse: 2+ [] : no achilles tendon tenderness

## 2023-07-26 ENCOUNTER — APPOINTMENT (OUTPATIENT)
Dept: NEUROLOGY | Facility: CLINIC | Age: 63
End: 2023-07-26
Payer: COMMERCIAL

## 2023-07-26 DIAGNOSIS — M79.2 NEURALGIA AND NEURITIS, UNSPECIFIED: ICD-10-CM

## 2023-07-26 DIAGNOSIS — M79.673 PAIN IN UNSPECIFIED FOOT: ICD-10-CM

## 2023-07-26 PROCEDURE — 95912 NRV CNDJ TEST 11-12 STUDIES: CPT

## 2023-07-26 PROCEDURE — 95886 MUSC TEST DONE W/N TEST COMP: CPT

## 2023-08-15 ENCOUNTER — APPOINTMENT (OUTPATIENT)
Dept: ORTHOPEDIC SURGERY | Facility: CLINIC | Age: 63
End: 2023-08-15
Payer: COMMERCIAL

## 2023-08-15 DIAGNOSIS — M19.071 PRIMARY OSTEOARTHRITIS, RIGHT ANKLE AND FOOT: ICD-10-CM

## 2023-08-15 DIAGNOSIS — M77.41 METATARSALGIA, RIGHT FOOT: ICD-10-CM

## 2023-08-15 PROCEDURE — 99214 OFFICE O/P EST MOD 30 MIN: CPT

## 2023-08-15 RX ORDER — GABAPENTIN 300 MG/1
300 CAPSULE ORAL
Qty: 30 | Refills: 0 | Status: ACTIVE | COMMUNITY
Start: 2023-08-15 | End: 1900-01-01

## 2023-08-15 RX ORDER — LIDOCAINE 5% 700 MG/1
5 PATCH TOPICAL
Qty: 30 | Refills: 0 | Status: ACTIVE | COMMUNITY
Start: 2023-08-15 | End: 1900-01-01

## 2023-08-15 NOTE — DISCUSSION/SUMMARY
[de-identified] : Sympotms into 1/2 toes not well explained with 3rd webspce neuroma.  Unclear etiology.  Consider trial of neurontin

## 2023-08-15 NOTE — HISTORY OF PRESENT ILLNESS
[4] : 4 [7] : 7 [de-identified] : L ankle fell from bldg 11/09. Increasing mechanical sx w/ activity  10/23/12 f/u L ankle pain after 10 minutes 1/21/13 DAILY PAIN 3/19/13: s/p L ankle arthroscopy with chondral picking (3/11/13), doing well, NWB, dressing intact. 4/9/13 f/u L ankle has been wb, awaiting auth for visco 1/9/14 f/u L ankle Incr pain. 5/10/16 f/u L ankle Increasing pain. Takes Motrin. Good response to visco injections 6/7/16 f/u L ankle pain persists. Good response to visco in the past. 6/16/16 Supartz #2 L ankle 6/23/16: Supartz # 3 L ankle 6/28/16: Supartz # 4 L ankle 7/7/15: Supartz # 5 L ankle. Good response to injections. 3/28/17: f/u L ankle, continued pain to the ankle, requesting injection. Good response to supartz.  7/18/17: L ankle orthovisc sample # 1 7/26/17: L ankle orthovisc sample #2 8/3/17: L ankle orthovisc # 3 8/8/17: L ankle orthovisc # 4. Improved pain.   7/18/23: ROSEMARIE MARTINEZ a 62 year old male here for evaluation of his right foot. Pain to the ball of the right foot that started in the winter of 2022. Pain and burning into the 1-3 toes..  Pain is worse when the temperature drops. He has been taking tylenol with mild relief. He saw Dr Lovell on 4/27/23.  He had a CSI x 2 with podiatry a few months ago with worsening pain. An MRI was ordered. He is taking meloxicam. R Ankle surgery done 20 years ago with Dr. Ochoa.  08/15/23: f/u right foot, Here for EMG results  Still has burning 1-3 toes [FreeTextEntry1] : Right foot